# Patient Record
Sex: MALE | Race: BLACK OR AFRICAN AMERICAN
[De-identification: names, ages, dates, MRNs, and addresses within clinical notes are randomized per-mention and may not be internally consistent; named-entity substitution may affect disease eponyms.]

---

## 2019-07-13 ENCOUNTER — HOSPITAL ENCOUNTER (INPATIENT)
Dept: HOSPITAL 26 - MED | Age: 74
LOS: 12 days | Discharge: NURSING, REHAB OR LONG-TERM CARE | DRG: 870 | End: 2019-07-25
Attending: GENERAL PRACTICE | Admitting: GENERAL PRACTICE
Payer: COMMERCIAL

## 2019-07-13 VITALS
DIASTOLIC BLOOD PRESSURE: 103 MMHG | HEIGHT: 70 IN | SYSTOLIC BLOOD PRESSURE: 175 MMHG | WEIGHT: 120 LBS | BODY MASS INDEX: 17.18 KG/M2

## 2019-07-13 VITALS — DIASTOLIC BLOOD PRESSURE: 89 MMHG | SYSTOLIC BLOOD PRESSURE: 185 MMHG

## 2019-07-13 DIAGNOSIS — J69.0: ICD-10-CM

## 2019-07-13 DIAGNOSIS — E43: ICD-10-CM

## 2019-07-13 DIAGNOSIS — I25.2: ICD-10-CM

## 2019-07-13 DIAGNOSIS — Z91.14: ICD-10-CM

## 2019-07-13 DIAGNOSIS — I13.0: ICD-10-CM

## 2019-07-13 DIAGNOSIS — E87.6: ICD-10-CM

## 2019-07-13 DIAGNOSIS — A41.9: Primary | ICD-10-CM

## 2019-07-13 DIAGNOSIS — E87.2: ICD-10-CM

## 2019-07-13 DIAGNOSIS — J98.11: ICD-10-CM

## 2019-07-13 DIAGNOSIS — I50.9: ICD-10-CM

## 2019-07-13 DIAGNOSIS — E83.42: ICD-10-CM

## 2019-07-13 DIAGNOSIS — E03.9: ICD-10-CM

## 2019-07-13 DIAGNOSIS — Z59.0: ICD-10-CM

## 2019-07-13 DIAGNOSIS — J44.1: ICD-10-CM

## 2019-07-13 DIAGNOSIS — I25.10: ICD-10-CM

## 2019-07-13 DIAGNOSIS — J96.21: ICD-10-CM

## 2019-07-13 DIAGNOSIS — N17.0: ICD-10-CM

## 2019-07-13 DIAGNOSIS — N18.9: ICD-10-CM

## 2019-07-13 DIAGNOSIS — I21.A1: ICD-10-CM

## 2019-07-13 DIAGNOSIS — I16.1: ICD-10-CM

## 2019-07-13 DIAGNOSIS — I69.354: ICD-10-CM

## 2019-07-13 DIAGNOSIS — E11.22: ICD-10-CM

## 2019-07-13 DIAGNOSIS — J84.9: ICD-10-CM

## 2019-07-13 DIAGNOSIS — Z23: ICD-10-CM

## 2019-07-13 DIAGNOSIS — R65.21: ICD-10-CM

## 2019-07-13 DIAGNOSIS — F17.210: ICD-10-CM

## 2019-07-13 DIAGNOSIS — E78.5: ICD-10-CM

## 2019-07-13 LAB
ALBUMIN FLD-MCNC: 3 G/DL (ref 3.4–5)
ANION GAP SERPL CALCULATED.3IONS-SCNC: 18.1 MMOL/L (ref 8–16)
AST SERPL-CCNC: 15 U/L (ref 15–37)
BASOPHILS # BLD AUTO: 0 K/UL (ref 0–0.22)
BASOPHILS NFR BLD AUTO: 0.6 % (ref 0–2)
BILIRUB SERPL-MCNC: 0.5 MG/DL (ref 0–1)
BUN SERPL-MCNC: 17 MG/DL (ref 7–18)
CHLORIDE SERPL-SCNC: 104 MMOL/L (ref 98–107)
CO2 SERPL-SCNC: 21.7 MMOL/L (ref 21–32)
CREAT SERPL-MCNC: 1.7 MG/DL (ref 0.7–1.3)
EOSINOPHIL # BLD AUTO: 0.1 K/UL (ref 0–0.4)
EOSINOPHIL NFR BLD AUTO: 1.7 % (ref 0–4)
ERYTHROCYTE [DISTWIDTH] IN BLOOD BY AUTOMATED COUNT: 15.5 % (ref 11.6–13.7)
GFR SERPL CREATININE-BSD FRML MDRD: (no result) ML/MIN (ref 90–?)
GLUCOSE SERPL-MCNC: 243 MG/DL (ref 74–106)
HCT VFR BLD AUTO: 44.2 % (ref 36–52)
HGB BLD-MCNC: 14.1 G/DL (ref 12–18)
LYMPHOCYTES # BLD AUTO: 1.2 K/UL (ref 2–11.5)
LYMPHOCYTES NFR BLD AUTO: 22.8 % (ref 20.5–51.1)
MAGNESIUM SERPL-MCNC: 1.8 MG/DL (ref 1.8–2.4)
MCH RBC QN AUTO: 29 PG (ref 27–31)
MCHC RBC AUTO-ENTMCNC: 32 G/DL (ref 33–37)
MCV RBC AUTO: 91.1 FL (ref 80–94)
MONOCYTES # BLD AUTO: 0.3 K/UL (ref 0.8–1)
MONOCYTES NFR BLD AUTO: 6 % (ref 1.7–9.3)
NEUTROPHILS # BLD AUTO: 3.7 K/UL (ref 1.8–7.7)
NEUTROPHILS NFR BLD AUTO: 68.9 % (ref 42.2–75.2)
PLATELET # BLD AUTO: 207 K/UL (ref 140–450)
POTASSIUM SERPL-SCNC: 3.8 MMOL/L (ref 3.5–5.1)
RBC # BLD AUTO: 4.85 MIL/UL (ref 4.2–6.1)
SODIUM SERPL-SCNC: 140 MMOL/L (ref 136–145)
TSH SERPL DL<=0.05 MIU/L-ACNC: 5.21 UIU/ML (ref 0.34–3.74)
WBC # BLD AUTO: 5.4 K/UL (ref 4.8–10.8)

## 2019-07-13 PROCEDURE — C1751 CATH, INF, PER/CENT/MIDLINE: HCPCS

## 2019-07-13 PROCEDURE — 5A09357 ASSISTANCE WITH RESPIRATORY VENTILATION, LESS THAN 24 CONSECUTIVE HOURS, CONTINUOUS POSITIVE AIRWAY PRESSURE: ICD-10-PCS

## 2019-07-13 PROCEDURE — C9113 INJ PANTOPRAZOLE SODIUM, VIA: HCPCS

## 2019-07-13 SDOH — ECONOMIC STABILITY - HOUSING INSECURITY: HOMELESSNESS: Z59.0

## 2019-07-13 NOTE — NUR
Patient will be admitted to care of DR. GARSIA. Admited to ICU.  Will go to room 
5. Belongings list completed.  Report to ICU RN.

## 2019-07-13 NOTE — NUR
PT IS A 73 Y/O MALE WHO PRESENTS TO THE ED C/O SHORTNESS OF BREATH. PER EMS PT 
WAS WALKING OUTSIDE AND SUDDENLY BECAME SOB. PT NOTED TO BE ON CPAP ON INITIAL 
ASSESSMENT, WITH INITIAL O2 SAT 80S. PATIENT NOTED TO HAVE DIFFICULTY 
BREATHING. LUNG SOUNDS DIMINISHED IN BILATERAL BASES. PT IN NO SIGNS OF CP, 
N/V/D. PT AWAKE AND ALERT, ABLE TO ANSWER QUESTIONS APPROPRIATELY. PT 
REPOSITIONED FOR COMFORT, BED IN LOWEST POSITION. ER MD DR. FONG NOTIFIED. 
WILL CONTINUE TO MONITOR. 



HX HTN, DM

NKA

## 2019-07-13 NOTE — NUR
PT RECEIVED IN ER VIA AMBULANCE ON CPAP. PT SOB. PLACED ON V60 BIPAP 10/5 100% AND 
ADMINISTERED DUONEB TX INLINE. PT WHEEZING BILATERALLY. ABG DONE AND RESULTS CALLED TO ER 
PHYSICIAN. FIO2 CHANGED TO 50% PER MD'S APPROVAL. PT STABLE AT THIS TIME. NO DISTRESS NOTED. 
WILL MONITOR.

## 2019-07-14 VITALS — DIASTOLIC BLOOD PRESSURE: 78 MMHG | SYSTOLIC BLOOD PRESSURE: 140 MMHG

## 2019-07-14 VITALS — SYSTOLIC BLOOD PRESSURE: 112 MMHG | DIASTOLIC BLOOD PRESSURE: 75 MMHG

## 2019-07-14 VITALS — DIASTOLIC BLOOD PRESSURE: 128 MMHG | SYSTOLIC BLOOD PRESSURE: 199 MMHG

## 2019-07-14 VITALS — DIASTOLIC BLOOD PRESSURE: 114 MMHG | SYSTOLIC BLOOD PRESSURE: 155 MMHG

## 2019-07-14 VITALS — DIASTOLIC BLOOD PRESSURE: 64 MMHG | SYSTOLIC BLOOD PRESSURE: 102 MMHG

## 2019-07-14 VITALS — SYSTOLIC BLOOD PRESSURE: 130 MMHG | DIASTOLIC BLOOD PRESSURE: 63 MMHG

## 2019-07-14 VITALS — SYSTOLIC BLOOD PRESSURE: 123 MMHG | DIASTOLIC BLOOD PRESSURE: 78 MMHG

## 2019-07-14 VITALS — SYSTOLIC BLOOD PRESSURE: 145 MMHG | DIASTOLIC BLOOD PRESSURE: 89 MMHG

## 2019-07-14 VITALS — DIASTOLIC BLOOD PRESSURE: 128 MMHG | SYSTOLIC BLOOD PRESSURE: 173 MMHG

## 2019-07-14 VITALS — SYSTOLIC BLOOD PRESSURE: 137 MMHG | DIASTOLIC BLOOD PRESSURE: 75 MMHG

## 2019-07-14 VITALS — SYSTOLIC BLOOD PRESSURE: 140 MMHG | DIASTOLIC BLOOD PRESSURE: 73 MMHG

## 2019-07-14 VITALS — SYSTOLIC BLOOD PRESSURE: 145 MMHG | DIASTOLIC BLOOD PRESSURE: 67 MMHG

## 2019-07-14 VITALS — DIASTOLIC BLOOD PRESSURE: 71 MMHG | SYSTOLIC BLOOD PRESSURE: 132 MMHG

## 2019-07-14 VITALS — DIASTOLIC BLOOD PRESSURE: 66 MMHG | SYSTOLIC BLOOD PRESSURE: 100 MMHG

## 2019-07-14 VITALS — DIASTOLIC BLOOD PRESSURE: 66 MMHG | SYSTOLIC BLOOD PRESSURE: 119 MMHG

## 2019-07-14 VITALS — SYSTOLIC BLOOD PRESSURE: 164 MMHG | DIASTOLIC BLOOD PRESSURE: 92 MMHG

## 2019-07-14 VITALS — DIASTOLIC BLOOD PRESSURE: 86 MMHG | SYSTOLIC BLOOD PRESSURE: 145 MMHG

## 2019-07-14 VITALS — SYSTOLIC BLOOD PRESSURE: 134 MMHG | DIASTOLIC BLOOD PRESSURE: 72 MMHG

## 2019-07-14 VITALS — DIASTOLIC BLOOD PRESSURE: 75 MMHG | SYSTOLIC BLOOD PRESSURE: 171 MMHG

## 2019-07-14 VITALS — DIASTOLIC BLOOD PRESSURE: 69 MMHG | SYSTOLIC BLOOD PRESSURE: 137 MMHG

## 2019-07-14 VITALS — DIASTOLIC BLOOD PRESSURE: 67 MMHG | SYSTOLIC BLOOD PRESSURE: 128 MMHG

## 2019-07-14 VITALS — DIASTOLIC BLOOD PRESSURE: 65 MMHG | SYSTOLIC BLOOD PRESSURE: 119 MMHG

## 2019-07-14 VITALS — SYSTOLIC BLOOD PRESSURE: 104 MMHG | DIASTOLIC BLOOD PRESSURE: 68 MMHG

## 2019-07-14 VITALS — DIASTOLIC BLOOD PRESSURE: 75 MMHG | SYSTOLIC BLOOD PRESSURE: 137 MMHG

## 2019-07-14 VITALS — DIASTOLIC BLOOD PRESSURE: 109 MMHG | SYSTOLIC BLOOD PRESSURE: 156 MMHG

## 2019-07-14 VITALS — DIASTOLIC BLOOD PRESSURE: 95 MMHG | SYSTOLIC BLOOD PRESSURE: 150 MMHG

## 2019-07-14 VITALS — DIASTOLIC BLOOD PRESSURE: 84 MMHG | SYSTOLIC BLOOD PRESSURE: 128 MMHG

## 2019-07-14 VITALS — DIASTOLIC BLOOD PRESSURE: 62 MMHG | SYSTOLIC BLOOD PRESSURE: 159 MMHG

## 2019-07-14 VITALS — SYSTOLIC BLOOD PRESSURE: 123 MMHG | DIASTOLIC BLOOD PRESSURE: 69 MMHG

## 2019-07-14 VITALS — SYSTOLIC BLOOD PRESSURE: 130 MMHG | DIASTOLIC BLOOD PRESSURE: 67 MMHG

## 2019-07-14 VITALS — DIASTOLIC BLOOD PRESSURE: 74 MMHG | SYSTOLIC BLOOD PRESSURE: 149 MMHG

## 2019-07-14 VITALS — SYSTOLIC BLOOD PRESSURE: 138 MMHG | DIASTOLIC BLOOD PRESSURE: 89 MMHG

## 2019-07-14 VITALS — DIASTOLIC BLOOD PRESSURE: 66 MMHG | SYSTOLIC BLOOD PRESSURE: 159 MMHG

## 2019-07-14 VITALS — SYSTOLIC BLOOD PRESSURE: 125 MMHG | DIASTOLIC BLOOD PRESSURE: 63 MMHG

## 2019-07-14 VITALS — DIASTOLIC BLOOD PRESSURE: 63 MMHG | SYSTOLIC BLOOD PRESSURE: 133 MMHG

## 2019-07-14 VITALS — DIASTOLIC BLOOD PRESSURE: 70 MMHG | SYSTOLIC BLOOD PRESSURE: 112 MMHG

## 2019-07-14 VITALS — DIASTOLIC BLOOD PRESSURE: 94 MMHG | SYSTOLIC BLOOD PRESSURE: 173 MMHG

## 2019-07-14 VITALS — SYSTOLIC BLOOD PRESSURE: 122 MMHG | DIASTOLIC BLOOD PRESSURE: 72 MMHG

## 2019-07-14 VITALS — SYSTOLIC BLOOD PRESSURE: 112 MMHG | DIASTOLIC BLOOD PRESSURE: 76 MMHG

## 2019-07-14 VITALS — DIASTOLIC BLOOD PRESSURE: 118 MMHG | SYSTOLIC BLOOD PRESSURE: 189 MMHG

## 2019-07-14 VITALS — DIASTOLIC BLOOD PRESSURE: 91 MMHG | SYSTOLIC BLOOD PRESSURE: 157 MMHG

## 2019-07-14 VITALS — DIASTOLIC BLOOD PRESSURE: 63 MMHG | SYSTOLIC BLOOD PRESSURE: 125 MMHG

## 2019-07-14 VITALS — DIASTOLIC BLOOD PRESSURE: 75 MMHG | SYSTOLIC BLOOD PRESSURE: 116 MMHG

## 2019-07-14 VITALS — SYSTOLIC BLOOD PRESSURE: 130 MMHG | DIASTOLIC BLOOD PRESSURE: 76 MMHG

## 2019-07-14 VITALS — DIASTOLIC BLOOD PRESSURE: 48 MMHG | SYSTOLIC BLOOD PRESSURE: 98 MMHG

## 2019-07-14 VITALS — SYSTOLIC BLOOD PRESSURE: 127 MMHG | DIASTOLIC BLOOD PRESSURE: 86 MMHG

## 2019-07-14 VITALS — SYSTOLIC BLOOD PRESSURE: 145 MMHG | DIASTOLIC BLOOD PRESSURE: 62 MMHG

## 2019-07-14 VITALS — SYSTOLIC BLOOD PRESSURE: 148 MMHG | DIASTOLIC BLOOD PRESSURE: 84 MMHG

## 2019-07-14 VITALS — DIASTOLIC BLOOD PRESSURE: 92 MMHG | SYSTOLIC BLOOD PRESSURE: 118 MMHG

## 2019-07-14 VITALS — SYSTOLIC BLOOD PRESSURE: 120 MMHG | DIASTOLIC BLOOD PRESSURE: 89 MMHG

## 2019-07-14 VITALS — SYSTOLIC BLOOD PRESSURE: 123 MMHG | DIASTOLIC BLOOD PRESSURE: 62 MMHG

## 2019-07-14 VITALS — DIASTOLIC BLOOD PRESSURE: 60 MMHG | SYSTOLIC BLOOD PRESSURE: 128 MMHG

## 2019-07-14 VITALS — DIASTOLIC BLOOD PRESSURE: 81 MMHG | SYSTOLIC BLOOD PRESSURE: 138 MMHG

## 2019-07-14 VITALS — DIASTOLIC BLOOD PRESSURE: 63 MMHG | SYSTOLIC BLOOD PRESSURE: 148 MMHG

## 2019-07-14 VITALS — SYSTOLIC BLOOD PRESSURE: 131 MMHG | DIASTOLIC BLOOD PRESSURE: 68 MMHG

## 2019-07-14 VITALS — DIASTOLIC BLOOD PRESSURE: 72 MMHG | SYSTOLIC BLOOD PRESSURE: 134 MMHG

## 2019-07-14 VITALS — SYSTOLIC BLOOD PRESSURE: 132 MMHG | DIASTOLIC BLOOD PRESSURE: 71 MMHG

## 2019-07-14 VITALS — SYSTOLIC BLOOD PRESSURE: 135 MMHG | DIASTOLIC BLOOD PRESSURE: 70 MMHG

## 2019-07-14 VITALS — DIASTOLIC BLOOD PRESSURE: 75 MMHG | SYSTOLIC BLOOD PRESSURE: 112 MMHG

## 2019-07-14 VITALS — SYSTOLIC BLOOD PRESSURE: 161 MMHG | DIASTOLIC BLOOD PRESSURE: 79 MMHG

## 2019-07-14 VITALS — DIASTOLIC BLOOD PRESSURE: 85 MMHG | SYSTOLIC BLOOD PRESSURE: 100 MMHG

## 2019-07-14 VITALS — DIASTOLIC BLOOD PRESSURE: 133 MMHG | SYSTOLIC BLOOD PRESSURE: 225 MMHG

## 2019-07-14 VITALS — SYSTOLIC BLOOD PRESSURE: 137 MMHG | DIASTOLIC BLOOD PRESSURE: 85 MMHG

## 2019-07-14 VITALS — SYSTOLIC BLOOD PRESSURE: 159 MMHG | DIASTOLIC BLOOD PRESSURE: 88 MMHG

## 2019-07-14 VITALS — SYSTOLIC BLOOD PRESSURE: 148 MMHG | DIASTOLIC BLOOD PRESSURE: 99 MMHG

## 2019-07-14 VITALS — SYSTOLIC BLOOD PRESSURE: 163 MMHG | DIASTOLIC BLOOD PRESSURE: 92 MMHG

## 2019-07-14 VITALS — SYSTOLIC BLOOD PRESSURE: 127 MMHG | DIASTOLIC BLOOD PRESSURE: 67 MMHG

## 2019-07-14 VITALS — SYSTOLIC BLOOD PRESSURE: 134 MMHG | DIASTOLIC BLOOD PRESSURE: 77 MMHG

## 2019-07-14 VITALS — SYSTOLIC BLOOD PRESSURE: 201 MMHG | DIASTOLIC BLOOD PRESSURE: 144 MMHG

## 2019-07-14 VITALS — DIASTOLIC BLOOD PRESSURE: 70 MMHG | SYSTOLIC BLOOD PRESSURE: 106 MMHG

## 2019-07-14 VITALS — DIASTOLIC BLOOD PRESSURE: 64 MMHG | SYSTOLIC BLOOD PRESSURE: 159 MMHG

## 2019-07-14 VITALS — SYSTOLIC BLOOD PRESSURE: 155 MMHG | DIASTOLIC BLOOD PRESSURE: 107 MMHG

## 2019-07-14 VITALS — SYSTOLIC BLOOD PRESSURE: 133 MMHG | DIASTOLIC BLOOD PRESSURE: 67 MMHG

## 2019-07-14 VITALS — SYSTOLIC BLOOD PRESSURE: 128 MMHG | DIASTOLIC BLOOD PRESSURE: 82 MMHG

## 2019-07-14 LAB
AMYLASE SERPL-CCNC: 87 U/L (ref 25–115)
ANION GAP SERPL CALCULATED.3IONS-SCNC: 14.4 MMOL/L (ref 8–16)
APPEARANCE UR: (no result)
BARBITURATES UR QL SCN: (no result) NG/ML
BASOPHILS # BLD AUTO: 0 K/UL (ref 0–0.22)
BASOPHILS NFR BLD AUTO: 0.7 % (ref 0–2)
BENZODIAZ UR QL SCN: (no result) NG/ML
BILIRUB UR QL STRIP: NEGATIVE
BUN SERPL-MCNC: 16 MG/DL (ref 7–18)
BZE UR QL SCN: (no result) NG/ML
CANNABINOIDS UR QL SCN: (no result) NG/ML
CHLORIDE SERPL-SCNC: 108 MMOL/L (ref 98–107)
CHOLEST/HDLC SERPL: 3.3 {RATIO} (ref 1–4.5)
CO2 SERPL-SCNC: 22.5 MMOL/L (ref 21–32)
COLOR UR: YELLOW
CREAT SERPL-MCNC: 1.5 MG/DL (ref 0.7–1.3)
EOSINOPHIL # BLD AUTO: 0 K/UL (ref 0–0.4)
EOSINOPHIL NFR BLD AUTO: 0.3 % (ref 0–4)
ERYTHROCYTE [DISTWIDTH] IN BLOOD BY AUTOMATED COUNT: 14.8 % (ref 11.6–13.7)
GFR SERPL CREATININE-BSD FRML MDRD: (no result) ML/MIN (ref 90–?)
GLUCOSE SERPL-MCNC: 187 MG/DL (ref 74–106)
GLUCOSE UR STRIP-MCNC: NEGATIVE MG/DL
HCT VFR BLD AUTO: 37.5 % (ref 36–52)
HDLC SERPL-MCNC: 58 MG/DL (ref 40–60)
HGB BLD-MCNC: 12 G/DL (ref 12–18)
HGB UR QL STRIP: (no result)
LDLC SERPL CALC-MCNC: 119 MG/DL (ref 60–100)
LEUKOCYTE ESTERASE UR QL STRIP: NEGATIVE
LIPASE SERPL-CCNC: 86 U/L (ref 73–393)
LYMPHOCYTES # BLD AUTO: 0.7 K/UL (ref 2–11.5)
LYMPHOCYTES NFR BLD AUTO: 12 % (ref 20.5–51.1)
MAGNESIUM SERPL-MCNC: 1.7 MG/DL (ref 1.8–2.4)
MCH RBC QN AUTO: 29 PG (ref 27–31)
MCHC RBC AUTO-ENTMCNC: 32 G/DL (ref 33–37)
MCV RBC AUTO: 90.2 FL (ref 80–94)
MONOCYTES # BLD AUTO: 0.4 K/UL (ref 0.8–1)
MONOCYTES NFR BLD AUTO: 6 % (ref 1.7–9.3)
NEUTROPHILS # BLD AUTO: 4.9 K/UL (ref 1.8–7.7)
NEUTROPHILS NFR BLD AUTO: 81 % (ref 42.2–75.2)
NITRITE UR QL STRIP: NEGATIVE
OPIATES UR QL SCN: (no result) NG/ML
PCP UR QL SCN: (no result) NG/ML
PH UR STRIP: 5.5 [PH] (ref 5–9)
PHOSPHATE SERPL-MCNC: 3.6 MG/DL (ref 2.5–4.9)
PHOSPHATE SERPL-MCNC: 4.6 MG/DL (ref 2.5–4.9)
PLATELET # BLD AUTO: 182 K/UL (ref 140–450)
POTASSIUM SERPL-SCNC: 3.9 MMOL/L (ref 3.5–5.1)
PROTHROMBIN TIME: 10.6 SECS (ref 10.8–13.4)
RBC # BLD AUTO: 4.15 MIL/UL (ref 4.2–6.1)
RBC #/AREA URNS HPF: (no result) /HPF (ref 0–5)
SODIUM SERPL-SCNC: 141 MMOL/L (ref 136–145)
T4 FREE SERPL-MCNC: 1 NG/DL (ref 0.76–1.46)
TRIGL SERPL-MCNC: 75 MG/DL (ref 30–150)
TSH SERPL DL<=0.05 MIU/L-ACNC: 5.09 UIU/ML (ref 0.34–3.74)
WBC # BLD AUTO: 6.1 K/UL (ref 4.8–10.8)
WBC,URINE: (no result) /HPF (ref 0–5)

## 2019-07-14 PROCEDURE — 0BH17EZ INSERTION OF ENDOTRACHEAL AIRWAY INTO TRACHEA, VIA NATURAL OR ARTIFICIAL OPENING: ICD-10-PCS

## 2019-07-14 PROCEDURE — 5A1955Z RESPIRATORY VENTILATION, GREATER THAN 96 CONSECUTIVE HOURS: ICD-10-PCS

## 2019-07-14 RX ADMIN — IPRATROPIUM BROMIDE AND ALBUTEROL SULFATE SCH ML: .5; 3 SOLUTION RESPIRATORY (INHALATION) at 11:58

## 2019-07-14 RX ADMIN — ATORVASTATIN CALCIUM SCH MG: 20 TABLET, FILM COATED ORAL at 10:38

## 2019-07-14 RX ADMIN — INSULIN HUMAN PRN MLS/HR: 100 INJECTION, SOLUTION PARENTERAL at 09:27

## 2019-07-14 RX ADMIN — IPRATROPIUM BROMIDE AND ALBUTEROL SULFATE SCH ML: .5; 3 SOLUTION RESPIRATORY (INHALATION) at 23:22

## 2019-07-14 RX ADMIN — DEXTROSE AND SODIUM CHLORIDE SCH MLS/HR: 5; .9 INJECTION, SOLUTION INTRAVENOUS at 01:04

## 2019-07-14 RX ADMIN — INSULIN LISPRO PRN UNITS: 100 INJECTION, SOLUTION INTRAVENOUS; SUBCUTANEOUS at 16:29

## 2019-07-14 RX ADMIN — IPRATROPIUM BROMIDE AND ALBUTEROL SULFATE PRN ML: .5; 3 SOLUTION RESPIRATORY (INHALATION) at 00:39

## 2019-07-14 RX ADMIN — PANTOPRAZOLE SODIUM SCH MG: 40 INJECTION, POWDER, FOR SOLUTION INTRAVENOUS at 10:37

## 2019-07-14 RX ADMIN — Medication SCH DEV: at 11:56

## 2019-07-14 RX ADMIN — IPRATROPIUM BROMIDE AND ALBUTEROL SULFATE PRN ML: .5; 3 SOLUTION RESPIRATORY (INHALATION) at 09:47

## 2019-07-14 RX ADMIN — INSULIN LISPRO PRN UNITS: 100 INJECTION, SOLUTION INTRAVENOUS; SUBCUTANEOUS at 11:51

## 2019-07-14 RX ADMIN — DEXTROSE AND SODIUM CHLORIDE SCH MLS/HR: 5; .9 INJECTION, SOLUTION INTRAVENOUS at 09:44

## 2019-07-14 RX ADMIN — BUDESONIDE SCH MG: 0.25 SUSPENSION RESPIRATORY (INHALATION) at 09:46

## 2019-07-14 RX ADMIN — HEPARIN SODIUM SCH MLS/HR: 5000 INJECTION, SOLUTION INTRAVENOUS at 10:26

## 2019-07-14 RX ADMIN — IPRATROPIUM BROMIDE AND ALBUTEROL SULFATE SCH ML: .5; 3 SOLUTION RESPIRATORY (INHALATION) at 19:55

## 2019-07-14 RX ADMIN — IPRATROPIUM BROMIDE AND ALBUTEROL SULFATE SCH ML: .5; 3 SOLUTION RESPIRATORY (INHALATION) at 15:42

## 2019-07-14 RX ADMIN — SODIUM CHLORIDE PRN MLS/HR: 9 INJECTION, SOLUTION INTRAVENOUS at 09:33

## 2019-07-14 RX ADMIN — Medication SCH DEV: at 16:30

## 2019-07-14 RX ADMIN — PROPOFOL PRN MLS/HR: 10 INJECTION, EMULSION INTRAVENOUS at 09:43

## 2019-07-14 RX ADMIN — DOCUSATE SODIUM SCH MG: 100 CAPSULE, LIQUID FILLED ORAL at 20:31

## 2019-07-14 RX ADMIN — Medication SCH MG: at 10:37

## 2019-07-14 RX ADMIN — DEXTROSE SCH MLS/HR: 50 INJECTION, SOLUTION INTRAVENOUS at 23:35

## 2019-07-14 RX ADMIN — DOCUSATE SODIUM SCH MG: 100 CAPSULE, LIQUID FILLED ORAL at 10:38

## 2019-07-14 RX ADMIN — DEXTROSE AND SODIUM CHLORIDE SCH MLS/HR: 5; .9 INJECTION, SOLUTION INTRAVENOUS at 20:25

## 2019-07-14 RX ADMIN — DEXTROSE SCH MLS/HR: 50 INJECTION, SOLUTION INTRAVENOUS at 22:13

## 2019-07-14 RX ADMIN — PROPOFOL PRN MLS/HR: 10 INJECTION, EMULSION INTRAVENOUS at 22:38

## 2019-07-14 RX ADMIN — Medication SCH DEV: at 20:47

## 2019-07-14 RX ADMIN — BUDESONIDE SCH MG: 0.25 SUSPENSION RESPIRATORY (INHALATION) at 19:55

## 2019-07-14 RX ADMIN — PROPOFOL PRN MLS/HR: 10 INJECTION, EMULSION INTRAVENOUS at 12:14

## 2019-07-14 RX ADMIN — PROPOFOL PRN MLS/HR: 10 INJECTION, EMULSION INTRAVENOUS at 16:25

## 2019-07-14 RX ADMIN — NOREPINEPHRINE BITARTRATE SCH MLS/HR: 1 INJECTION INTRAVENOUS at 09:55

## 2019-07-14 RX ADMIN — DEXTROSE SCH MLS/HR: 50 INJECTION, SOLUTION INTRAVENOUS at 15:21

## 2019-07-14 RX ADMIN — DORZOLAMIDE HYDROCHLORIDE SCH ML: 20 SOLUTION OPHTHALMIC at 09:00

## 2019-07-14 RX ADMIN — Medication SCH DEV: at 07:00

## 2019-07-14 RX ADMIN — NOREPINEPHRINE BITARTRATE SCH MLS/HR: 1 INJECTION INTRAVENOUS at 20:27

## 2019-07-14 RX ADMIN — INSULIN LISPRO PRN UNITS: 100 INJECTION, SOLUTION INTRAVENOUS; SUBCUTANEOUS at 20:51

## 2019-07-14 RX ADMIN — DORZOLAMIDE HYDROCHLORIDE SCH ML: 20 SOLUTION OPHTHALMIC at 20:30

## 2019-07-14 NOTE — NUR
RECEIVED REPORT FROM MORNING RN, ANNA MARIE, FOR CONTINUITY OF CARE. VS STABLE AT THIS TIME. 
AFEBRILE. FLACC 0. PT UNABLE TO MAKE NEEDS KNOW OR VERBALIZE UNDERSTANDING. PT CURRENTLY 
SEDATED WITH PROPOFOL AT 50MCG/KG/MIN WITH DRY WT 68KG. RASS -4. FENTANYL DRIP RUNNING AT 
34MCG/HR. VERSED AT 2MG/HR. PT DOES NOT APPEAR TO BE IN ANY DISCOMFORT AT THIS TIME. LUNG 
SOUNDS DIMINISHED. ETT TO VENT WITH SETTINGS A/C VC FIO2 80%, , RR16, AND PEEP 7. 
CHEST RISE SYMMETRIC. NO SOB NOTED. S1+S2 HEARD. PULSES PALPABLE IN ALL EXTREMITIES. AFIB ON 
MONITOR. BP WNL. PT ON AMIODARONE DRIP AT 0.5MG/MIN.  PT ON HEPARIN  UNITS/HR. ABDOMEN 
ROUND, SOFT AND NONDISTENDED. BS ACTIVE IN ALL QUADRANTS. NGT THROUGH RIGHT NARES. SECURED 
IN PLACE. PLACEMENT CHECKED. NO RESIDUAL ASPIRATED. PT HAS GLUCERNA AT 20ML/HR. PT HAS RIGHT 
UPPER ARM PICC LINE THAT IS PATENT, INTACT AND ASYMPTOMATIC. DRESSING C/D/I AS WELL. PT HAS 
D5 NS AT 75ML/HR. SKIN IS WARM AND DRY. PT'S BILATERAL FOOT IS DRY AND FLAKY. NELSON CATHETER 
IN PLACE WITH CLOUDY, DARK NARCISO URINE WITH SEDIMENTS. KEPT HOB AT 30 DEGREES. BED AT LOW 
POSSIBLE POSITION. ALL SAFETY PRECAUTIONS ARE IN PLACE.

## 2019-07-14 NOTE — NUR
DR. ALFONSO AND DR. MANSFIELD AT THE BEDSIDE. RT AT THE BEDSIDE. INTUBATION DONE BY DR. JONES. ASSISTED AS NEEDED.

## 2019-07-14 NOTE — NUR
ABG ON SETTINGS OF: RATE 16, , INSP TIME 0.8, PEEP 7, 80% O2 - PH 7.35, CO 34, PO 69, 
HCO3 19, SO2 93.5. DR JONES HAS ORDERED TO CHANGE PEEP TO 8.

## 2019-07-14 NOTE — NUR
CALLED DR. SALINAS TO NOTIFY HIM THAT PT'S FAMILY IS AT BEDSIDE AND WANTED TO SPEAK WITH HIM 
REGARDING THE PT'S CONDITION. PER DR. SALINAS, HE WILL COME TO THE UNIT

## 2019-07-14 NOTE — NUR
NO CHANGE IN CONDITION NOTED. CONTINUE ON SAAME VENT SETTING. ON SEDATION. RASS -4. HOB 
ELEVATED. IV SITES INTACT.

## 2019-07-14 NOTE — NUR
PATIENT HAS BEEN INTUBATED. SETTINGS: AC/PRVC RATE 14, , TI 0.80, PEEP 5, 100% O2. 
SUBSTERNAL RETRACTIONS CAN BE SEEN. CLOSELY MONITORING.

## 2019-07-14 NOTE — NUR
RECEIVED BEDSIDE REPORT FROM ER RN, PATIENT AWAKE, ABLE TO FOLLOW COMMANDS. O2SAT 86% RR 30 
LABORED AND DEEP, RT AT BEDSIDE, PLACED PATIENT ON BIPAP. A-FIB ON MONITOR, /92 MAP 
110. NOTED 1+ PITTING EDEMA ON BLE, SKIN PEELING ON BOTH FEET, NO OPEN WOUNDS. LEFT FA 18 G 
INFUSING VANCO  ML. RIGHT FA 20 GM DRESSING INTACT. BED BATH PROVIDED. ADMISSION 
QUESTIONS ASKED. MRSA SCREEN COLLECTED AND SENT TO LAB.

## 2019-07-14 NOTE — NUR
RESTING IN BED. CONTINUE ON VENT SETTING AC/PRVC FIO2 80%  , RR 16 PEEP 7. CONTINUE ON 
SEDATION RASS -4. HOB ELEVATED. BED IN LOW POSITION LOCKED.

## 2019-07-14 NOTE — NUR
RECEIVED REPORT FROM NIGHT SHIFT RN. PT IN BED, NOTED WITH LABORED BREATHING ON BIPAP, FIO2 
40%. A FIB ON MONITOR.  SPO2 91%. HOB ELEVATED. A/O X3. SKIN DRY AND  WARM TO TOUCH. LUNGS 
CLEAR. RFA 20G. D5%NS INFUSING  ML/HR. LFA 18G. SALINE LOCK. FLUSHED. LINES INTACT. 
ABDOMEN SOFT ROUND AND NON-TENDER. ACTIVE BOWEL SOUND. DRY, PEELING SKIN NOTED ON B/L FEET. 
EDEMATOUS BOTH LOWER EXTREMITIES NOTED. WILL CONTINUE TO MONITOR.

## 2019-07-14 NOTE — NUR
CHEST X-RAY RESULT REVEALED THAT THERE IS NO FOREIGN OBJECT IN THE CHEST. DR. SETHI IN 
ICU MADE AWARE. OLD GUIDE WIRE USED DURING CENTRAL LINE INSERTION FOR PATIENT WAS COMPARED 
TO NEW SET OF CENTRAL LINE GUIDE WIRE.  BOTH GUIDE WIRES WERE EQUAL IN LENGTH. NO BROKEN IN 
PATIENT'S GUIDE WIRE NOTED.

## 2019-07-14 NOTE — NUR
SUCTIONED LARGE AMOUNT OF BLOOD TINGED SPUTUM. VAP ORAL CARE PROVIDED. KEPT PT CLEAN AND 
DRY. REPOSITIONED. BED IN LOW POSITIONED LOCKED.

## 2019-07-14 NOTE — NUR
CALLED [facility], SPOKE TO HOUSE SUPERVISOR FOR URGENT TRANSFER TO HIGHER LEVEL OF CARE 
FOR GUIDE WIRE RETRIEVAL NEEDS FOR INTERVENTIONAL RADIOLOGY.

## 2019-07-14 NOTE — NUR
CALLED [facility], SPOKE TO ROSA M FOR URGENT TRANSFER TO HIGHER LEVEL OF CARE FOR GUIDE WIRE 
RETRIEVAL NEEDS FOR INTERVENTIONAL RADIOLOGY.

## 2019-07-14 NOTE — NUR
RECEIVED PT INTUBATED, VENT CK DONE, PEEP 8, AND I DECREASED FIO2 TO 70%, MED NEB IN LINE 
DUONEB AND FALLOW WITH PULMICORT

## 2019-07-14 NOTE — NUR
7/14/19 RD INITIAL ASSESSMENT COMPLETED 

PLEASE REFER TO NUTRITION ASSESSMENT UNDER CARE ACTIVITY FOR ESTIMATED NUTRITIONAL NEEDS. 



RD RECOMMENDATIONS:

1. RECOMMEND INCREASE TF RATE OF GLUCERNA 1.2 TO 65ML/HR, FWF:100ML Q 6HR. THIS PROVIDES 
1872KCAL, 94G PROTEIN AND 1660ML TOTAL FLUID DAILY. THIS MEETS 92% CALORIC NEEDS % 
PROTEIN NEEDS. 

2.PROPOFOL AT 12.24ML/HR PROVIDES AN ADDITIONAL 324KCAL.

3. RD WILL F/U 2-3 DAYS; HIGH RISK. 

KATTY CHISHOLM, RD

## 2019-07-14 NOTE — NUR
CALLED LAB TO NOTIFY THEM REGARDING NEW ORDERS THAT DR. CLARK PUT IN WHEN HE MADE ROUNDS. 
SPOKE WITH MUMTAZ AND ACCORDING TO HER IT WAS COLLECTED.

## 2019-07-14 NOTE — NUR
PATIENT HAS BEEN SCREENED AND CATEGORIZED AS HIGH NUTRITION RISK. PATIENT WILL BE SEEN 
WITHIN 1-2 DAYS OF ADMISSION.

7/15/19



KATTY CHISHOLM RD

## 2019-07-14 NOTE — NUR
PATIENT'S SETTINGS CHANGED TO: RATE 16, , INSP TIME 0.8, PEEP 7, 80% O2. PT IS STABLE. 
CLOSELY MONITORED.

## 2019-07-14 NOTE — NUR
DR. CLARK AT BEDSIDE TO SEE PT. UPDATED HIM REGARDING THE PT'S CONDITION. WILL FOLLOW-UP 
WITH ANY NEW ORDERS.

## 2019-07-15 VITALS — SYSTOLIC BLOOD PRESSURE: 147 MMHG | DIASTOLIC BLOOD PRESSURE: 112 MMHG

## 2019-07-15 VITALS — SYSTOLIC BLOOD PRESSURE: 159 MMHG | DIASTOLIC BLOOD PRESSURE: 74 MMHG

## 2019-07-15 VITALS — DIASTOLIC BLOOD PRESSURE: 69 MMHG | SYSTOLIC BLOOD PRESSURE: 132 MMHG

## 2019-07-15 VITALS — DIASTOLIC BLOOD PRESSURE: 78 MMHG | SYSTOLIC BLOOD PRESSURE: 157 MMHG

## 2019-07-15 VITALS — DIASTOLIC BLOOD PRESSURE: 94 MMHG | SYSTOLIC BLOOD PRESSURE: 151 MMHG

## 2019-07-15 VITALS — SYSTOLIC BLOOD PRESSURE: 146 MMHG | DIASTOLIC BLOOD PRESSURE: 84 MMHG

## 2019-07-15 VITALS — SYSTOLIC BLOOD PRESSURE: 155 MMHG | DIASTOLIC BLOOD PRESSURE: 79 MMHG

## 2019-07-15 VITALS — DIASTOLIC BLOOD PRESSURE: 95 MMHG | SYSTOLIC BLOOD PRESSURE: 133 MMHG

## 2019-07-15 VITALS — SYSTOLIC BLOOD PRESSURE: 136 MMHG | DIASTOLIC BLOOD PRESSURE: 97 MMHG

## 2019-07-15 VITALS — DIASTOLIC BLOOD PRESSURE: 63 MMHG | SYSTOLIC BLOOD PRESSURE: 113 MMHG

## 2019-07-15 VITALS — DIASTOLIC BLOOD PRESSURE: 67 MMHG | SYSTOLIC BLOOD PRESSURE: 117 MMHG

## 2019-07-15 VITALS — DIASTOLIC BLOOD PRESSURE: 71 MMHG | SYSTOLIC BLOOD PRESSURE: 124 MMHG

## 2019-07-15 VITALS — DIASTOLIC BLOOD PRESSURE: 73 MMHG | SYSTOLIC BLOOD PRESSURE: 107 MMHG

## 2019-07-15 VITALS — DIASTOLIC BLOOD PRESSURE: 86 MMHG | SYSTOLIC BLOOD PRESSURE: 160 MMHG

## 2019-07-15 VITALS — SYSTOLIC BLOOD PRESSURE: 152 MMHG | DIASTOLIC BLOOD PRESSURE: 85 MMHG

## 2019-07-15 VITALS — DIASTOLIC BLOOD PRESSURE: 68 MMHG | SYSTOLIC BLOOD PRESSURE: 132 MMHG

## 2019-07-15 VITALS — SYSTOLIC BLOOD PRESSURE: 116 MMHG | DIASTOLIC BLOOD PRESSURE: 76 MMHG

## 2019-07-15 VITALS — DIASTOLIC BLOOD PRESSURE: 74 MMHG | SYSTOLIC BLOOD PRESSURE: 142 MMHG

## 2019-07-15 VITALS — DIASTOLIC BLOOD PRESSURE: 71 MMHG | SYSTOLIC BLOOD PRESSURE: 138 MMHG

## 2019-07-15 VITALS — DIASTOLIC BLOOD PRESSURE: 65 MMHG | SYSTOLIC BLOOD PRESSURE: 118 MMHG

## 2019-07-15 VITALS — DIASTOLIC BLOOD PRESSURE: 54 MMHG | SYSTOLIC BLOOD PRESSURE: 108 MMHG

## 2019-07-15 VITALS — SYSTOLIC BLOOD PRESSURE: 131 MMHG | DIASTOLIC BLOOD PRESSURE: 82 MMHG

## 2019-07-15 VITALS — DIASTOLIC BLOOD PRESSURE: 69 MMHG | SYSTOLIC BLOOD PRESSURE: 126 MMHG

## 2019-07-15 VITALS — SYSTOLIC BLOOD PRESSURE: 158 MMHG | DIASTOLIC BLOOD PRESSURE: 55 MMHG

## 2019-07-15 VITALS — DIASTOLIC BLOOD PRESSURE: 93 MMHG | SYSTOLIC BLOOD PRESSURE: 149 MMHG

## 2019-07-15 VITALS — SYSTOLIC BLOOD PRESSURE: 140 MMHG | DIASTOLIC BLOOD PRESSURE: 75 MMHG

## 2019-07-15 VITALS — SYSTOLIC BLOOD PRESSURE: 172 MMHG | DIASTOLIC BLOOD PRESSURE: 110 MMHG

## 2019-07-15 VITALS — DIASTOLIC BLOOD PRESSURE: 85 MMHG | SYSTOLIC BLOOD PRESSURE: 157 MMHG

## 2019-07-15 VITALS — DIASTOLIC BLOOD PRESSURE: 92 MMHG | SYSTOLIC BLOOD PRESSURE: 119 MMHG

## 2019-07-15 VITALS — DIASTOLIC BLOOD PRESSURE: 63 MMHG | SYSTOLIC BLOOD PRESSURE: 94 MMHG

## 2019-07-15 VITALS — SYSTOLIC BLOOD PRESSURE: 172 MMHG | DIASTOLIC BLOOD PRESSURE: 99 MMHG

## 2019-07-15 VITALS — DIASTOLIC BLOOD PRESSURE: 58 MMHG | SYSTOLIC BLOOD PRESSURE: 139 MMHG

## 2019-07-15 VITALS — DIASTOLIC BLOOD PRESSURE: 73 MMHG | SYSTOLIC BLOOD PRESSURE: 117 MMHG

## 2019-07-15 VITALS — DIASTOLIC BLOOD PRESSURE: 124 MMHG | SYSTOLIC BLOOD PRESSURE: 103 MMHG

## 2019-07-15 VITALS — SYSTOLIC BLOOD PRESSURE: 138 MMHG | DIASTOLIC BLOOD PRESSURE: 61 MMHG

## 2019-07-15 VITALS — DIASTOLIC BLOOD PRESSURE: 77 MMHG | SYSTOLIC BLOOD PRESSURE: 146 MMHG

## 2019-07-15 VITALS — SYSTOLIC BLOOD PRESSURE: 137 MMHG | DIASTOLIC BLOOD PRESSURE: 78 MMHG

## 2019-07-15 VITALS — SYSTOLIC BLOOD PRESSURE: 124 MMHG | DIASTOLIC BLOOD PRESSURE: 78 MMHG

## 2019-07-15 VITALS — SYSTOLIC BLOOD PRESSURE: 104 MMHG | DIASTOLIC BLOOD PRESSURE: 55 MMHG

## 2019-07-15 VITALS — DIASTOLIC BLOOD PRESSURE: 84 MMHG | SYSTOLIC BLOOD PRESSURE: 125 MMHG

## 2019-07-15 VITALS — SYSTOLIC BLOOD PRESSURE: 143 MMHG | DIASTOLIC BLOOD PRESSURE: 97 MMHG

## 2019-07-15 VITALS — DIASTOLIC BLOOD PRESSURE: 71 MMHG | SYSTOLIC BLOOD PRESSURE: 121 MMHG

## 2019-07-15 VITALS — SYSTOLIC BLOOD PRESSURE: 127 MMHG | DIASTOLIC BLOOD PRESSURE: 66 MMHG

## 2019-07-15 VITALS — SYSTOLIC BLOOD PRESSURE: 92 MMHG | DIASTOLIC BLOOD PRESSURE: 51 MMHG

## 2019-07-15 VITALS — SYSTOLIC BLOOD PRESSURE: 152 MMHG | DIASTOLIC BLOOD PRESSURE: 82 MMHG

## 2019-07-15 VITALS — DIASTOLIC BLOOD PRESSURE: 72 MMHG | SYSTOLIC BLOOD PRESSURE: 128 MMHG

## 2019-07-15 VITALS — DIASTOLIC BLOOD PRESSURE: 73 MMHG | SYSTOLIC BLOOD PRESSURE: 145 MMHG

## 2019-07-15 VITALS — DIASTOLIC BLOOD PRESSURE: 70 MMHG | SYSTOLIC BLOOD PRESSURE: 153 MMHG

## 2019-07-15 VITALS — SYSTOLIC BLOOD PRESSURE: 98 MMHG | DIASTOLIC BLOOD PRESSURE: 57 MMHG

## 2019-07-15 VITALS — DIASTOLIC BLOOD PRESSURE: 79 MMHG | SYSTOLIC BLOOD PRESSURE: 136 MMHG

## 2019-07-15 VITALS — DIASTOLIC BLOOD PRESSURE: 71 MMHG | SYSTOLIC BLOOD PRESSURE: 145 MMHG

## 2019-07-15 VITALS — SYSTOLIC BLOOD PRESSURE: 128 MMHG | DIASTOLIC BLOOD PRESSURE: 72 MMHG

## 2019-07-15 VITALS — DIASTOLIC BLOOD PRESSURE: 79 MMHG | SYSTOLIC BLOOD PRESSURE: 169 MMHG

## 2019-07-15 VITALS — DIASTOLIC BLOOD PRESSURE: 77 MMHG | SYSTOLIC BLOOD PRESSURE: 118 MMHG

## 2019-07-15 VITALS — DIASTOLIC BLOOD PRESSURE: 83 MMHG | SYSTOLIC BLOOD PRESSURE: 138 MMHG

## 2019-07-15 VITALS — DIASTOLIC BLOOD PRESSURE: 80 MMHG | SYSTOLIC BLOOD PRESSURE: 120 MMHG

## 2019-07-15 VITALS — DIASTOLIC BLOOD PRESSURE: 90 MMHG | SYSTOLIC BLOOD PRESSURE: 154 MMHG

## 2019-07-15 VITALS — SYSTOLIC BLOOD PRESSURE: 113 MMHG | DIASTOLIC BLOOD PRESSURE: 73 MMHG

## 2019-07-15 VITALS — SYSTOLIC BLOOD PRESSURE: 112 MMHG | DIASTOLIC BLOOD PRESSURE: 80 MMHG

## 2019-07-15 VITALS — DIASTOLIC BLOOD PRESSURE: 86 MMHG | SYSTOLIC BLOOD PRESSURE: 165 MMHG

## 2019-07-15 VITALS — SYSTOLIC BLOOD PRESSURE: 129 MMHG | DIASTOLIC BLOOD PRESSURE: 72 MMHG

## 2019-07-15 VITALS — DIASTOLIC BLOOD PRESSURE: 53 MMHG | SYSTOLIC BLOOD PRESSURE: 109 MMHG

## 2019-07-15 VITALS — SYSTOLIC BLOOD PRESSURE: 136 MMHG | DIASTOLIC BLOOD PRESSURE: 73 MMHG

## 2019-07-15 VITALS — SYSTOLIC BLOOD PRESSURE: 138 MMHG | DIASTOLIC BLOOD PRESSURE: 83 MMHG

## 2019-07-15 VITALS — DIASTOLIC BLOOD PRESSURE: 78 MMHG | SYSTOLIC BLOOD PRESSURE: 117 MMHG

## 2019-07-15 VITALS — DIASTOLIC BLOOD PRESSURE: 70 MMHG | SYSTOLIC BLOOD PRESSURE: 127 MMHG

## 2019-07-15 VITALS — DIASTOLIC BLOOD PRESSURE: 53 MMHG | SYSTOLIC BLOOD PRESSURE: 99 MMHG

## 2019-07-15 VITALS — DIASTOLIC BLOOD PRESSURE: 66 MMHG | SYSTOLIC BLOOD PRESSURE: 108 MMHG

## 2019-07-15 VITALS — DIASTOLIC BLOOD PRESSURE: 112 MMHG | SYSTOLIC BLOOD PRESSURE: 161 MMHG

## 2019-07-15 VITALS — SYSTOLIC BLOOD PRESSURE: 138 MMHG | DIASTOLIC BLOOD PRESSURE: 63 MMHG

## 2019-07-15 VITALS — DIASTOLIC BLOOD PRESSURE: 59 MMHG | SYSTOLIC BLOOD PRESSURE: 89 MMHG

## 2019-07-15 VITALS — SYSTOLIC BLOOD PRESSURE: 124 MMHG | DIASTOLIC BLOOD PRESSURE: 75 MMHG

## 2019-07-15 VITALS — DIASTOLIC BLOOD PRESSURE: 88 MMHG | SYSTOLIC BLOOD PRESSURE: 147 MMHG

## 2019-07-15 VITALS — SYSTOLIC BLOOD PRESSURE: 159 MMHG | DIASTOLIC BLOOD PRESSURE: 86 MMHG

## 2019-07-15 VITALS — SYSTOLIC BLOOD PRESSURE: 129 MMHG | DIASTOLIC BLOOD PRESSURE: 75 MMHG

## 2019-07-15 VITALS — SYSTOLIC BLOOD PRESSURE: 131 MMHG | DIASTOLIC BLOOD PRESSURE: 69 MMHG

## 2019-07-15 VITALS — DIASTOLIC BLOOD PRESSURE: 67 MMHG | SYSTOLIC BLOOD PRESSURE: 119 MMHG

## 2019-07-15 VITALS — SYSTOLIC BLOOD PRESSURE: 119 MMHG | DIASTOLIC BLOOD PRESSURE: 75 MMHG

## 2019-07-15 VITALS — DIASTOLIC BLOOD PRESSURE: 73 MMHG | SYSTOLIC BLOOD PRESSURE: 101 MMHG

## 2019-07-15 VITALS — DIASTOLIC BLOOD PRESSURE: 73 MMHG | SYSTOLIC BLOOD PRESSURE: 110 MMHG

## 2019-07-15 VITALS — SYSTOLIC BLOOD PRESSURE: 145 MMHG | DIASTOLIC BLOOD PRESSURE: 83 MMHG

## 2019-07-15 VITALS — DIASTOLIC BLOOD PRESSURE: 60 MMHG | SYSTOLIC BLOOD PRESSURE: 115 MMHG

## 2019-07-15 VITALS — SYSTOLIC BLOOD PRESSURE: 146 MMHG | DIASTOLIC BLOOD PRESSURE: 53 MMHG

## 2019-07-15 VITALS — SYSTOLIC BLOOD PRESSURE: 169 MMHG | DIASTOLIC BLOOD PRESSURE: 79 MMHG

## 2019-07-15 VITALS — SYSTOLIC BLOOD PRESSURE: 120 MMHG | DIASTOLIC BLOOD PRESSURE: 74 MMHG

## 2019-07-15 VITALS — SYSTOLIC BLOOD PRESSURE: 90 MMHG | DIASTOLIC BLOOD PRESSURE: 61 MMHG

## 2019-07-15 VITALS — SYSTOLIC BLOOD PRESSURE: 118 MMHG | DIASTOLIC BLOOD PRESSURE: 69 MMHG

## 2019-07-15 VITALS — SYSTOLIC BLOOD PRESSURE: 113 MMHG | DIASTOLIC BLOOD PRESSURE: 78 MMHG

## 2019-07-15 VITALS — DIASTOLIC BLOOD PRESSURE: 112 MMHG | SYSTOLIC BLOOD PRESSURE: 156 MMHG

## 2019-07-15 VITALS — DIASTOLIC BLOOD PRESSURE: 99 MMHG | SYSTOLIC BLOOD PRESSURE: 155 MMHG

## 2019-07-15 VITALS — SYSTOLIC BLOOD PRESSURE: 117 MMHG | DIASTOLIC BLOOD PRESSURE: 86 MMHG

## 2019-07-15 VITALS — SYSTOLIC BLOOD PRESSURE: 121 MMHG | DIASTOLIC BLOOD PRESSURE: 75 MMHG

## 2019-07-15 VITALS — DIASTOLIC BLOOD PRESSURE: 73 MMHG | SYSTOLIC BLOOD PRESSURE: 123 MMHG

## 2019-07-15 LAB
ANION GAP SERPL CALCULATED.3IONS-SCNC: 12.4 MMOL/L (ref 8–16)
BASOPHILS # BLD AUTO: 0.1 K/UL (ref 0–0.22)
BASOPHILS NFR BLD AUTO: 0.9 % (ref 0–2)
BUN SERPL-MCNC: 18 MG/DL (ref 7–18)
CHLORIDE SERPL-SCNC: 105 MMOL/L (ref 98–107)
CO2 SERPL-SCNC: 25.5 MMOL/L (ref 21–32)
CREAT SERPL-MCNC: 1.8 MG/DL (ref 0.7–1.3)
EOSINOPHIL # BLD AUTO: 0.3 K/UL (ref 0–0.4)
EOSINOPHIL NFR BLD AUTO: 4 % (ref 0–4)
ERYTHROCYTE [DISTWIDTH] IN BLOOD BY AUTOMATED COUNT: 15.3 % (ref 11.6–13.7)
GFR SERPL CREATININE-BSD FRML MDRD: (no result) ML/MIN (ref 90–?)
GLUCOSE SERPL-MCNC: 167 MG/DL (ref 74–106)
HCT VFR BLD AUTO: 36.7 % (ref 36–52)
HGB BLD-MCNC: 11.9 G/DL (ref 12–18)
LYMPHOCYTES # BLD AUTO: 1.1 K/UL (ref 2–11.5)
LYMPHOCYTES NFR BLD AUTO: 13.9 % (ref 20.5–51.1)
MAGNESIUM SERPL-MCNC: 1.9 MG/DL (ref 1.8–2.4)
MCH RBC QN AUTO: 30 PG (ref 27–31)
MCHC RBC AUTO-ENTMCNC: 33 G/DL (ref 33–37)
MCV RBC AUTO: 90.8 FL (ref 80–94)
MONOCYTES # BLD AUTO: 0.4 K/UL (ref 0.8–1)
MONOCYTES NFR BLD AUTO: 5.7 % (ref 1.7–9.3)
NEUTROPHILS # BLD AUTO: 5.8 K/UL (ref 1.8–7.7)
NEUTROPHILS NFR BLD AUTO: 75.5 % (ref 42.2–75.2)
PHOSPHATE SERPL-MCNC: 4.2 MG/DL (ref 2.5–4.9)
PLATELET # BLD AUTO: 209 K/UL (ref 140–450)
POTASSIUM SERPL-SCNC: 3.9 MMOL/L (ref 3.5–5.1)
PROTHROMBIN TIME: 10 SECS (ref 10.8–13.4)
RBC # BLD AUTO: 4.04 MIL/UL (ref 4.2–6.1)
SODIUM SERPL-SCNC: 139 MMOL/L (ref 136–145)
WBC # BLD AUTO: 7.7 K/UL (ref 4.8–10.8)

## 2019-07-15 PROCEDURE — B548ZZA ULTRASONOGRAPHY OF SUPERIOR VENA CAVA, GUIDANCE: ICD-10-PCS

## 2019-07-15 PROCEDURE — 02HV33Z INSERTION OF INFUSION DEVICE INTO SUPERIOR VENA CAVA, PERCUTANEOUS APPROACH: ICD-10-PCS

## 2019-07-15 RX ADMIN — Medication SCH DEV: at 16:39

## 2019-07-15 RX ADMIN — SODIUM CHLORIDE PRN MLS/HR: 9 INJECTION, SOLUTION INTRAVENOUS at 22:02

## 2019-07-15 RX ADMIN — IPRATROPIUM BROMIDE AND ALBUTEROL SULFATE SCH ML: .5; 3 SOLUTION RESPIRATORY (INHALATION) at 23:36

## 2019-07-15 RX ADMIN — DEXTROSE SCH MLS/HR: 50 INJECTION, SOLUTION INTRAVENOUS at 15:51

## 2019-07-15 RX ADMIN — IPRATROPIUM BROMIDE AND ALBUTEROL SULFATE SCH ML: .5; 3 SOLUTION RESPIRATORY (INHALATION) at 19:24

## 2019-07-15 RX ADMIN — Medication SCH DEV: at 11:12

## 2019-07-15 RX ADMIN — ATORVASTATIN CALCIUM SCH MG: 20 TABLET, FILM COATED ORAL at 09:11

## 2019-07-15 RX ADMIN — PROPOFOL PRN MLS/HR: 10 INJECTION, EMULSION INTRAVENOUS at 06:44

## 2019-07-15 RX ADMIN — INSULIN LISPRO PRN UNITS: 100 INJECTION, SOLUTION INTRAVENOUS; SUBCUTANEOUS at 06:49

## 2019-07-15 RX ADMIN — Medication SCH DEV: at 20:51

## 2019-07-15 RX ADMIN — Medication SCH DEV: at 06:48

## 2019-07-15 RX ADMIN — Medication SCH MG: at 09:11

## 2019-07-15 RX ADMIN — DORZOLAMIDE HYDROCHLORIDE SCH ML: 20 SOLUTION OPHTHALMIC at 20:54

## 2019-07-15 RX ADMIN — IPRATROPIUM BROMIDE AND ALBUTEROL SULFATE SCH ML: .5; 3 SOLUTION RESPIRATORY (INHALATION) at 11:14

## 2019-07-15 RX ADMIN — PROPOFOL PRN MLS/HR: 10 INJECTION, EMULSION INTRAVENOUS at 23:53

## 2019-07-15 RX ADMIN — PROPOFOL PRN MLS/HR: 10 INJECTION, EMULSION INTRAVENOUS at 22:49

## 2019-07-15 RX ADMIN — BUDESONIDE SCH MG: 0.25 SUSPENSION RESPIRATORY (INHALATION) at 07:19

## 2019-07-15 RX ADMIN — Medication SCH MG: at 20:53

## 2019-07-15 RX ADMIN — DEXTROSE AND SODIUM CHLORIDE SCH MLS/HR: 5; .9 INJECTION, SOLUTION INTRAVENOUS at 02:22

## 2019-07-15 RX ADMIN — HEPARIN SODIUM SCH MLS/HR: 5000 INJECTION, SOLUTION INTRAVENOUS at 12:59

## 2019-07-15 RX ADMIN — PROPOFOL PRN MLS/HR: 10 INJECTION, EMULSION INTRAVENOUS at 21:59

## 2019-07-15 RX ADMIN — DEXTROSE SCH MLS/HR: 50 INJECTION, SOLUTION INTRAVENOUS at 06:45

## 2019-07-15 RX ADMIN — PANTOPRAZOLE SODIUM SCH MG: 40 INJECTION, POWDER, FOR SOLUTION INTRAVENOUS at 09:11

## 2019-07-15 RX ADMIN — DEXTROSE SCH MLS/HR: 50 INJECTION, SOLUTION INTRAVENOUS at 20:54

## 2019-07-15 RX ADMIN — PROPOFOL PRN MLS/HR: 10 INJECTION, EMULSION INTRAVENOUS at 12:56

## 2019-07-15 RX ADMIN — BUDESONIDE SCH MG: 0.25 SUSPENSION RESPIRATORY (INHALATION) at 19:24

## 2019-07-15 RX ADMIN — IPRATROPIUM BROMIDE AND ALBUTEROL SULFATE SCH ML: .5; 3 SOLUTION RESPIRATORY (INHALATION) at 03:38

## 2019-07-15 RX ADMIN — PROPOFOL PRN MLS/HR: 10 INJECTION, EMULSION INTRAVENOUS at 19:48

## 2019-07-15 RX ADMIN — DEXTROSE SCH MLS/HR: 50 INJECTION, SOLUTION INTRAVENOUS at 23:33

## 2019-07-15 RX ADMIN — DOCUSATE SODIUM SCH MG: 100 CAPSULE, LIQUID FILLED ORAL at 20:53

## 2019-07-15 RX ADMIN — PROPOFOL PRN MLS/HR: 10 INJECTION, EMULSION INTRAVENOUS at 03:00

## 2019-07-15 RX ADMIN — HEPARIN SODIUM SCH MLS/HR: 5000 INJECTION, SOLUTION INTRAVENOUS at 22:27

## 2019-07-15 RX ADMIN — DORZOLAMIDE HYDROCHLORIDE SCH ML: 20 SOLUTION OPHTHALMIC at 09:14

## 2019-07-15 RX ADMIN — INSULIN HUMAN PRN MLS/HR: 100 INJECTION, SOLUTION PARENTERAL at 09:13

## 2019-07-15 RX ADMIN — NOREPINEPHRINE BITARTRATE SCH MLS/HR: 1 INJECTION INTRAVENOUS at 10:42

## 2019-07-15 RX ADMIN — SODIUM CHLORIDE PRN MLS/HR: 9 INJECTION, SOLUTION INTRAVENOUS at 06:42

## 2019-07-15 RX ADMIN — IPRATROPIUM BROMIDE AND ALBUTEROL SULFATE SCH ML: .5; 3 SOLUTION RESPIRATORY (INHALATION) at 07:17

## 2019-07-15 RX ADMIN — DEXTROSE AND SODIUM CHLORIDE SCH MLS/HR: 5; .9 INJECTION, SOLUTION INTRAVENOUS at 15:52

## 2019-07-15 RX ADMIN — PROPOFOL PRN MLS/HR: 10 INJECTION, EMULSION INTRAVENOUS at 23:08

## 2019-07-15 RX ADMIN — DOCUSATE SODIUM SCH MG: 100 CAPSULE, LIQUID FILLED ORAL at 09:11

## 2019-07-15 RX ADMIN — AMIODARONE HYDROCHLORIDE SCH MG: 200 TABLET ORAL at 20:54

## 2019-07-15 RX ADMIN — INSULIN HUMAN PRN MLS/HR: 100 INJECTION, SOLUTION PARENTERAL at 21:32

## 2019-07-15 RX ADMIN — PROPOFOL PRN MLS/HR: 10 INJECTION, EMULSION INTRAVENOUS at 17:42

## 2019-07-15 RX ADMIN — IPRATROPIUM BROMIDE AND ALBUTEROL SULFATE SCH ML: .5; 3 SOLUTION RESPIRATORY (INHALATION) at 16:20

## 2019-07-15 NOTE — NUR
DR. AMAYA MADE AWARE OF PT'S INCREASED BLOOD PRESSURE 161/111 AND RR IN 40'S. TEMP 100.4. 
DR. AMAYA AT BEDSIDE EXAMINING PT. WILL FOLLOW UP ON ORDERS.

## 2019-07-15 NOTE — NUR
WOUND CARE EVALUATION FOR LOW SAMANTHA SCALE AT RISK, PRESSURE INJURY PREVENTION INTERVENTIONS 
IN PLACE. SKIN ASSESSMENT DONE WITH PRIMARY RN, NO OPEN ACTIVE WOUNDS, XEROSIS TO UPPER AND 
LOWER EXTREMITIES.

RECOMMENDATIONS:

-PLEASE APPLY HYDRA GUARD  TO BILATERAL UPPER AND LOWER EXTREMITIES BID AND LEAVE IT OPEN TO 
AIR

-APPLY HYDRA GUARD  TO SACRALCOCCYX BID AND LEAVE IT OPEN TO AIR AS PREVENTION

-TURN AND REPOSITION PATIENT Q 2H 

-ASSESS AND MONITOR SKIN CONDITION DURING POSITION CHANGE

-OFFLOAD BILATERAL HEELS BY PLACING PILLOWS UNDER CALVES AT ALL TIMES, UNLESS OTHERWISE 
CONTRAINDICATED

-PRESSURE REDISTRIBUTION BY PLACING PILLOWS AND OFFLOADING SACRALCOCCYX

-KEEP SKIN CLEAN AND DRY AT ALL TIMES.

## 2019-07-15 NOTE — NUR
VS REMAINS STABLE. SINUS ARRHYTHMIA ON MONITOR. RESPIRATIONS ARE EVEN AND UNLABORED. PT DOES 
NOT APPEAR TO BE EXPERIENCING ANY DISCOMFORT AT THIS TIME. FLACC 0. RASS -4. ALL SAFETY 
PRECAUTIONS ARE IN PLACE. PICC LINE IN USE AT THIS TIME. ALL DRIPS ARE LABELED. WILL 
CONTINUE TO MONITOR PT.

## 2019-07-15 NOTE — NUR
PT SEEN AND EXAMINED BY DR. JONES. DR. JONES SPOKE WITH PT'S DAUGHTER-IN-LAW SWAPNIL 
REGARDING PT'S CONDITION. WILL FOLLOW UP ON ORDERS.

## 2019-07-15 NOTE — NUR
VAP ORAL CARE GIVEN. TURNED AND REPOSITIONED FOR COMFORT AND PRESSURE OFF LOADING. PT 
TOLERATED WELL. VSS. WILL CONTINUE TO MONITOR.

## 2019-07-15 NOTE — NUR
APTT 43.9. BOLUS PATIENT WITH 2000 UNITS HEPARIN AND INCREASED HEPARIN DRIP TO 1240 UNITS 
INFUSING AT 12.4 ML/HR.

## 2019-07-15 NOTE — NUR
RECEIVED PT ON VENT RATE 16, , PEEP 5, O2 55%. HR 96, %. NO RESP DISTRESS. SEMI 
FOWLERS POSITION. VENT BRAKES ON. VENT PLUGGED INTO RED OUTLETS. AMBU BAG AT BEDSIDE.

## 2019-07-15 NOTE — NUR
CALLED RESIDENT DR BERRIOS TO REPORT FEEDING RESIDUALS  ML. ACCORDING TO DAY SHIFT NURSE 
DR BURNETT WANTS FEEDINGS AT 10 CC CONTINUOUSLY. ORDERED TO HOLD FEEDINGS FOR NOW AND DR BERRIOS 
WILL FOLLOW UP WITH ORDERS.

## 2019-07-15 NOTE — NUR
NO CHANGE IN PT'S CONDITION AT THIS TIME. VS STABLE. NO BM NOTED. ALL SAFETY PRECAUTIONS ARE 
IN PLACE. WILL CONTINUE TO MONITOR PT.

## 2019-07-15 NOTE — NUR
RECEIVED BEDSIDE REPORT FROM NIGHT SHIFT RN. PT IS SEDATED, RASS -4. AFEBRILE. FLACC 0. PT 
UNABLE TO MAKE NEEDS KNOW OR FOLLOW COMMANDS. A. FIB ON MONITOR. S1+S2 HEARD. PULSES 
PALPABLE IN ALL EXTREMITIES. PT IS ETT TO VENT WITH SETTINGS A/C PRVC FIO2 55%, , RR 
16, AND PEEP 5. CHEST RISE SYMMETRIC BUT BREATHING LABORED. RHONCHI HEARD BILATERALLY, 
DIMINISHED LOWER LOBES. PICC LINE TO RIGHT UPPER ARM, PERIPHERAL IV G 20 TO RIGHT FOREARM, G 
18 TO LEFT FOREARM PATENT, INTACT, ASYMPTOMATIC WITH GOOD BLOOD RETURN. PT IS ON PROPOFOL 
DRIP AT 50 MCG/KG/MIN WITH DRY WT 68KG, FENTANYL DRIP RUNNING AT 34 MCG/HR, VERSED DRIP AT 
2MG/HR, AMIODARONE DRIP AT 0.5 MG/MIN, HEPARIN DRIP  UNITS/HR, IVF D5 NS AT 75ML/HR. 
NGT IN PLACE TO RIGHT NARE, PLACEMENT CONFIRMED, RESIDUAL 100 ML. PT IS RECEIVING TF 
GLUCERNA AT 40ML/HR, WATER FLUSH 100 ML Q6H. SKIN IS DRY AND WARM TO TOUCH, DRY AND FLAKY 
BILATERAL FOOT NOTED. NELSON CATH IN PLACE DRAINING CLOUDY, DARK NARCISO URINE WITH SEDIMENTS. 
HOB AT 30 DEGREES. BED AT LOWEST POSITION LOCKED. CALL LIGHT WITHIN REACH. WILL CONTINUE TO 
MONITOR.

## 2019-07-15 NOTE — NUR
RECEIVED A CALL FROM LAB, PER MUMTAZ TB GOLD TO BE DRAWN ON 07/15/19 IN THE AFTERNOON. WILL 
COMMUNICATE TO THE NEXT SHIFT.

## 2019-07-15 NOTE — NUR
RECEIVED BEDSIDE REPORT FROM DAY SHIFT NAZIA METZ. PATIENT ETT TO VENT, SIZE 8, 24 CM AT LIP 
LINE. RT AT BEDSIDE GIVING BREATHING TREATMENT, ON AIRBORNE ISOLATION FOR RULE OUT TB, ALL 
SPUTUM SAMPLES HAVE BEEN COLLECTED AND SENT TO LAB, RESULTS ARE PENDING. HOB AT 30 DEGREES. 
NO SIGNS OF AGITATION AT THIS TIME. PROPOFOL INFUSING IN RIGHT PICC LINE AT  35 MCG/KG/MIN. 
FENTANYL INFUSING AT 68 MCG/HR AND VERSED INFUSING AT 4 MG/HR IN RIGHT UPPER ARM PICC, 
DRESSING CLEAN AND INTACT. HEPARIN INFUSING AT 1100 UNITS/ 9.6 ML/HR IN RIGHT FA 20 G 
PERIPHERAL LINE. NG TUBE IN PLACE IN RIGHT NARE. INFUSING GLUCERNA 1.2 AT 10 CC WITH WATER 
FLUSH 100 Q6H. RESIDUALS  ML. ACCORDING TO DAY SHIFT NURSE DR BURNETT WANTED TO KEEP 
FEEDINGS INFUSING AT 10 CC. NELSON CATH IN PLACE DRAINING CLEAR YELLOW URINE  ML. ORAL 
CARE PROVIDED, SCANT WHITE SECRETIONS SUCTIONED. NOTED PEELING ON BOTH FEET, NO OPEN WOUNDS. 
LEFT ARM 18 G IV SL DRESSING INTACT. BED IN LOWEST POSITION. WILL MONITOR CLOSELY.  

-------------------------------------------------------------------------------

Addendum: 07/15/19 at 2019 by Beverly Arcos RN

-------------------------------------------------------------------------------

PROPOFOL DRY WEIGHT 68 KG BEING USED

## 2019-07-15 NOTE — NUR
PT REMAINS SEDATED AT THIS TIME. RASS -4. FLACC 0. VS STABLE WITH HR BETWEEN SINUS 
ARRHYTHMIA AND AFIB. RESPIRATIONS ARE EVEN AND UNLABORED. CHEST RISE SYMMETRIC. OXYGEN 
SATURATIONS WNL. NO BM NOTED AT THIS TIME. WILL RECHECK RESIDUAL AND INCREASE FEEDING AS 
TOLERATED. ALL IV SITES REMAINS INTACT AND ASYMPTOMATIC.

## 2019-07-15 NOTE — NUR
REPORT GIVEN TO MORNING RNISAÍAS FOR CONTINUITY OF CARE. VS STABLE AT THIS TIME. ENDORSED 
PT'S BELONGINGS THAT ARE AT BEDSIDE.

## 2019-07-15 NOTE — NUR
PATIENT HAD A SHORT RUNS OF NON-SUSTAINED V-TACH AROUND 5 IN A ROW; DR. DRAKE INFORMED AND 
AWARE; NO NEW ORDER MADE.

## 2019-07-15 NOTE — NUR
RECEIVED A CALL FROM DR. CLARK, UPDATES GIVEN ON PT'S CONDITION. PER DR. CLARK, 
DISCONTINUE VANCOMYCIN. PHARMACIST MADE AWARE.

## 2019-07-15 NOTE — NUR
**** P.T. NOTES ****



D/C P.T EVAL ORDER DUE TO PATIENT IS INTUBATED PER NURSE MARTIN. PLAN: WE'LL AWAIT FOR NEW 
ORDERS WHEN HE GETS EXTUBATED AND IS MORE MEDICALLY APPROPRIATE FOR P.T. SERVICES.

## 2019-07-16 VITALS — SYSTOLIC BLOOD PRESSURE: 101 MMHG | DIASTOLIC BLOOD PRESSURE: 63 MMHG

## 2019-07-16 VITALS — DIASTOLIC BLOOD PRESSURE: 69 MMHG | SYSTOLIC BLOOD PRESSURE: 115 MMHG

## 2019-07-16 VITALS — SYSTOLIC BLOOD PRESSURE: 107 MMHG | DIASTOLIC BLOOD PRESSURE: 71 MMHG

## 2019-07-16 VITALS — DIASTOLIC BLOOD PRESSURE: 74 MMHG | SYSTOLIC BLOOD PRESSURE: 125 MMHG

## 2019-07-16 VITALS — DIASTOLIC BLOOD PRESSURE: 76 MMHG | SYSTOLIC BLOOD PRESSURE: 158 MMHG

## 2019-07-16 VITALS — DIASTOLIC BLOOD PRESSURE: 71 MMHG | SYSTOLIC BLOOD PRESSURE: 120 MMHG

## 2019-07-16 VITALS — SYSTOLIC BLOOD PRESSURE: 133 MMHG | DIASTOLIC BLOOD PRESSURE: 76 MMHG

## 2019-07-16 VITALS — SYSTOLIC BLOOD PRESSURE: 127 MMHG | DIASTOLIC BLOOD PRESSURE: 72 MMHG

## 2019-07-16 VITALS — DIASTOLIC BLOOD PRESSURE: 83 MMHG | SYSTOLIC BLOOD PRESSURE: 144 MMHG

## 2019-07-16 VITALS — SYSTOLIC BLOOD PRESSURE: 157 MMHG | DIASTOLIC BLOOD PRESSURE: 102 MMHG

## 2019-07-16 VITALS — DIASTOLIC BLOOD PRESSURE: 59 MMHG | SYSTOLIC BLOOD PRESSURE: 95 MMHG

## 2019-07-16 VITALS — DIASTOLIC BLOOD PRESSURE: 84 MMHG | SYSTOLIC BLOOD PRESSURE: 120 MMHG

## 2019-07-16 VITALS — SYSTOLIC BLOOD PRESSURE: 122 MMHG | DIASTOLIC BLOOD PRESSURE: 62 MMHG

## 2019-07-16 VITALS — DIASTOLIC BLOOD PRESSURE: 70 MMHG | SYSTOLIC BLOOD PRESSURE: 125 MMHG

## 2019-07-16 VITALS — SYSTOLIC BLOOD PRESSURE: 182 MMHG | DIASTOLIC BLOOD PRESSURE: 107 MMHG

## 2019-07-16 VITALS — DIASTOLIC BLOOD PRESSURE: 75 MMHG | SYSTOLIC BLOOD PRESSURE: 119 MMHG

## 2019-07-16 VITALS — DIASTOLIC BLOOD PRESSURE: 62 MMHG | SYSTOLIC BLOOD PRESSURE: 124 MMHG

## 2019-07-16 VITALS — SYSTOLIC BLOOD PRESSURE: 119 MMHG | DIASTOLIC BLOOD PRESSURE: 73 MMHG

## 2019-07-16 VITALS — DIASTOLIC BLOOD PRESSURE: 73 MMHG | SYSTOLIC BLOOD PRESSURE: 128 MMHG

## 2019-07-16 VITALS — DIASTOLIC BLOOD PRESSURE: 71 MMHG | SYSTOLIC BLOOD PRESSURE: 126 MMHG

## 2019-07-16 VITALS — DIASTOLIC BLOOD PRESSURE: 60 MMHG | SYSTOLIC BLOOD PRESSURE: 117 MMHG

## 2019-07-16 VITALS — SYSTOLIC BLOOD PRESSURE: 116 MMHG | DIASTOLIC BLOOD PRESSURE: 63 MMHG

## 2019-07-16 VITALS — DIASTOLIC BLOOD PRESSURE: 74 MMHG | SYSTOLIC BLOOD PRESSURE: 113 MMHG

## 2019-07-16 VITALS — SYSTOLIC BLOOD PRESSURE: 117 MMHG | DIASTOLIC BLOOD PRESSURE: 63 MMHG

## 2019-07-16 VITALS — SYSTOLIC BLOOD PRESSURE: 123 MMHG | DIASTOLIC BLOOD PRESSURE: 71 MMHG

## 2019-07-16 VITALS — SYSTOLIC BLOOD PRESSURE: 149 MMHG | DIASTOLIC BLOOD PRESSURE: 78 MMHG

## 2019-07-16 VITALS — DIASTOLIC BLOOD PRESSURE: 59 MMHG | SYSTOLIC BLOOD PRESSURE: 94 MMHG

## 2019-07-16 VITALS — SYSTOLIC BLOOD PRESSURE: 123 MMHG | DIASTOLIC BLOOD PRESSURE: 76 MMHG

## 2019-07-16 VITALS — DIASTOLIC BLOOD PRESSURE: 72 MMHG | SYSTOLIC BLOOD PRESSURE: 120 MMHG

## 2019-07-16 VITALS — DIASTOLIC BLOOD PRESSURE: 69 MMHG | SYSTOLIC BLOOD PRESSURE: 119 MMHG

## 2019-07-16 VITALS — DIASTOLIC BLOOD PRESSURE: 91 MMHG | SYSTOLIC BLOOD PRESSURE: 138 MMHG

## 2019-07-16 VITALS — SYSTOLIC BLOOD PRESSURE: 145 MMHG | DIASTOLIC BLOOD PRESSURE: 94 MMHG

## 2019-07-16 VITALS — DIASTOLIC BLOOD PRESSURE: 76 MMHG | SYSTOLIC BLOOD PRESSURE: 142 MMHG

## 2019-07-16 VITALS — SYSTOLIC BLOOD PRESSURE: 103 MMHG | DIASTOLIC BLOOD PRESSURE: 57 MMHG

## 2019-07-16 VITALS — DIASTOLIC BLOOD PRESSURE: 64 MMHG | SYSTOLIC BLOOD PRESSURE: 121 MMHG

## 2019-07-16 VITALS — SYSTOLIC BLOOD PRESSURE: 104 MMHG | DIASTOLIC BLOOD PRESSURE: 68 MMHG

## 2019-07-16 VITALS — DIASTOLIC BLOOD PRESSURE: 73 MMHG | SYSTOLIC BLOOD PRESSURE: 106 MMHG

## 2019-07-16 VITALS — SYSTOLIC BLOOD PRESSURE: 125 MMHG | DIASTOLIC BLOOD PRESSURE: 70 MMHG

## 2019-07-16 VITALS — DIASTOLIC BLOOD PRESSURE: 72 MMHG | SYSTOLIC BLOOD PRESSURE: 122 MMHG

## 2019-07-16 VITALS — DIASTOLIC BLOOD PRESSURE: 80 MMHG | SYSTOLIC BLOOD PRESSURE: 154 MMHG

## 2019-07-16 VITALS — DIASTOLIC BLOOD PRESSURE: 78 MMHG | SYSTOLIC BLOOD PRESSURE: 153 MMHG

## 2019-07-16 VITALS — DIASTOLIC BLOOD PRESSURE: 84 MMHG | SYSTOLIC BLOOD PRESSURE: 144 MMHG

## 2019-07-16 VITALS — DIASTOLIC BLOOD PRESSURE: 57 MMHG | SYSTOLIC BLOOD PRESSURE: 102 MMHG

## 2019-07-16 VITALS — SYSTOLIC BLOOD PRESSURE: 120 MMHG | DIASTOLIC BLOOD PRESSURE: 84 MMHG

## 2019-07-16 VITALS — SYSTOLIC BLOOD PRESSURE: 130 MMHG | DIASTOLIC BLOOD PRESSURE: 72 MMHG

## 2019-07-16 VITALS — DIASTOLIC BLOOD PRESSURE: 73 MMHG | SYSTOLIC BLOOD PRESSURE: 124 MMHG

## 2019-07-16 VITALS — SYSTOLIC BLOOD PRESSURE: 137 MMHG | DIASTOLIC BLOOD PRESSURE: 69 MMHG

## 2019-07-16 VITALS — DIASTOLIC BLOOD PRESSURE: 77 MMHG | SYSTOLIC BLOOD PRESSURE: 125 MMHG

## 2019-07-16 VITALS — SYSTOLIC BLOOD PRESSURE: 159 MMHG | DIASTOLIC BLOOD PRESSURE: 87 MMHG

## 2019-07-16 VITALS — SYSTOLIC BLOOD PRESSURE: 109 MMHG | DIASTOLIC BLOOD PRESSURE: 67 MMHG

## 2019-07-16 VITALS — SYSTOLIC BLOOD PRESSURE: 124 MMHG | DIASTOLIC BLOOD PRESSURE: 68 MMHG

## 2019-07-16 VITALS — DIASTOLIC BLOOD PRESSURE: 60 MMHG | SYSTOLIC BLOOD PRESSURE: 111 MMHG

## 2019-07-16 VITALS — SYSTOLIC BLOOD PRESSURE: 121 MMHG | DIASTOLIC BLOOD PRESSURE: 71 MMHG

## 2019-07-16 VITALS — SYSTOLIC BLOOD PRESSURE: 114 MMHG | DIASTOLIC BLOOD PRESSURE: 63 MMHG

## 2019-07-16 VITALS — DIASTOLIC BLOOD PRESSURE: 68 MMHG | SYSTOLIC BLOOD PRESSURE: 138 MMHG

## 2019-07-16 VITALS — DIASTOLIC BLOOD PRESSURE: 60 MMHG | SYSTOLIC BLOOD PRESSURE: 103 MMHG

## 2019-07-16 VITALS — DIASTOLIC BLOOD PRESSURE: 77 MMHG | SYSTOLIC BLOOD PRESSURE: 128 MMHG

## 2019-07-16 VITALS — SYSTOLIC BLOOD PRESSURE: 95 MMHG | DIASTOLIC BLOOD PRESSURE: 69 MMHG

## 2019-07-16 VITALS — DIASTOLIC BLOOD PRESSURE: 72 MMHG | SYSTOLIC BLOOD PRESSURE: 123 MMHG

## 2019-07-16 VITALS — DIASTOLIC BLOOD PRESSURE: 76 MMHG | SYSTOLIC BLOOD PRESSURE: 124 MMHG

## 2019-07-16 VITALS — DIASTOLIC BLOOD PRESSURE: 75 MMHG | SYSTOLIC BLOOD PRESSURE: 121 MMHG

## 2019-07-16 VITALS — SYSTOLIC BLOOD PRESSURE: 122 MMHG | DIASTOLIC BLOOD PRESSURE: 65 MMHG

## 2019-07-16 VITALS — DIASTOLIC BLOOD PRESSURE: 72 MMHG | SYSTOLIC BLOOD PRESSURE: 142 MMHG

## 2019-07-16 VITALS — SYSTOLIC BLOOD PRESSURE: 123 MMHG | DIASTOLIC BLOOD PRESSURE: 72 MMHG

## 2019-07-16 VITALS — DIASTOLIC BLOOD PRESSURE: 72 MMHG | SYSTOLIC BLOOD PRESSURE: 124 MMHG

## 2019-07-16 VITALS — DIASTOLIC BLOOD PRESSURE: 56 MMHG | SYSTOLIC BLOOD PRESSURE: 103 MMHG

## 2019-07-16 VITALS — SYSTOLIC BLOOD PRESSURE: 122 MMHG | DIASTOLIC BLOOD PRESSURE: 72 MMHG

## 2019-07-16 VITALS — DIASTOLIC BLOOD PRESSURE: 77 MMHG | SYSTOLIC BLOOD PRESSURE: 111 MMHG

## 2019-07-16 VITALS — SYSTOLIC BLOOD PRESSURE: 121 MMHG | DIASTOLIC BLOOD PRESSURE: 66 MMHG

## 2019-07-16 VITALS — SYSTOLIC BLOOD PRESSURE: 111 MMHG | DIASTOLIC BLOOD PRESSURE: 66 MMHG

## 2019-07-16 VITALS — SYSTOLIC BLOOD PRESSURE: 122 MMHG | DIASTOLIC BLOOD PRESSURE: 73 MMHG

## 2019-07-16 VITALS — SYSTOLIC BLOOD PRESSURE: 125 MMHG | DIASTOLIC BLOOD PRESSURE: 71 MMHG

## 2019-07-16 VITALS — SYSTOLIC BLOOD PRESSURE: 125 MMHG | DIASTOLIC BLOOD PRESSURE: 65 MMHG

## 2019-07-16 VITALS — SYSTOLIC BLOOD PRESSURE: 112 MMHG | DIASTOLIC BLOOD PRESSURE: 64 MMHG

## 2019-07-16 VITALS — DIASTOLIC BLOOD PRESSURE: 62 MMHG | SYSTOLIC BLOOD PRESSURE: 122 MMHG

## 2019-07-16 VITALS — SYSTOLIC BLOOD PRESSURE: 135 MMHG | DIASTOLIC BLOOD PRESSURE: 60 MMHG

## 2019-07-16 VITALS — DIASTOLIC BLOOD PRESSURE: 66 MMHG | SYSTOLIC BLOOD PRESSURE: 116 MMHG

## 2019-07-16 VITALS — SYSTOLIC BLOOD PRESSURE: 129 MMHG | DIASTOLIC BLOOD PRESSURE: 69 MMHG

## 2019-07-16 VITALS — SYSTOLIC BLOOD PRESSURE: 115 MMHG | DIASTOLIC BLOOD PRESSURE: 74 MMHG

## 2019-07-16 VITALS — DIASTOLIC BLOOD PRESSURE: 71 MMHG | SYSTOLIC BLOOD PRESSURE: 122 MMHG

## 2019-07-16 VITALS — DIASTOLIC BLOOD PRESSURE: 69 MMHG | SYSTOLIC BLOOD PRESSURE: 99 MMHG

## 2019-07-16 VITALS — SYSTOLIC BLOOD PRESSURE: 100 MMHG | DIASTOLIC BLOOD PRESSURE: 66 MMHG

## 2019-07-16 VITALS — SYSTOLIC BLOOD PRESSURE: 93 MMHG | DIASTOLIC BLOOD PRESSURE: 52 MMHG

## 2019-07-16 VITALS — SYSTOLIC BLOOD PRESSURE: 116 MMHG | DIASTOLIC BLOOD PRESSURE: 70 MMHG

## 2019-07-16 VITALS — DIASTOLIC BLOOD PRESSURE: 76 MMHG | SYSTOLIC BLOOD PRESSURE: 135 MMHG

## 2019-07-16 VITALS — SYSTOLIC BLOOD PRESSURE: 106 MMHG | DIASTOLIC BLOOD PRESSURE: 61 MMHG

## 2019-07-16 VITALS — DIASTOLIC BLOOD PRESSURE: 60 MMHG | SYSTOLIC BLOOD PRESSURE: 97 MMHG

## 2019-07-16 VITALS — SYSTOLIC BLOOD PRESSURE: 140 MMHG | DIASTOLIC BLOOD PRESSURE: 68 MMHG

## 2019-07-16 VITALS — SYSTOLIC BLOOD PRESSURE: 120 MMHG | DIASTOLIC BLOOD PRESSURE: 53 MMHG

## 2019-07-16 VITALS — SYSTOLIC BLOOD PRESSURE: 119 MMHG | DIASTOLIC BLOOD PRESSURE: 60 MMHG

## 2019-07-16 VITALS — SYSTOLIC BLOOD PRESSURE: 93 MMHG | DIASTOLIC BLOOD PRESSURE: 54 MMHG

## 2019-07-16 LAB
ANION GAP SERPL CALCULATED.3IONS-SCNC: 13.4 MMOL/L (ref 8–16)
BASOPHILS # BLD AUTO: 0 K/UL (ref 0–0.22)
BASOPHILS NFR BLD AUTO: 0.4 % (ref 0–2)
BUN SERPL-MCNC: 18 MG/DL (ref 7–18)
CHLORIDE SERPL-SCNC: 103 MMOL/L (ref 98–107)
CO2 SERPL-SCNC: 23.4 MMOL/L (ref 21–32)
CREAT SERPL-MCNC: 1.7 MG/DL (ref 0.7–1.3)
EOSINOPHIL # BLD AUTO: 0.5 K/UL (ref 0–0.4)
EOSINOPHIL NFR BLD AUTO: 6.5 % (ref 0–4)
ERYTHROCYTE [DISTWIDTH] IN BLOOD BY AUTOMATED COUNT: 15.3 % (ref 11.6–13.7)
GFR SERPL CREATININE-BSD FRML MDRD: (no result) ML/MIN (ref 90–?)
GLUCOSE SERPL-MCNC: 149 MG/DL (ref 74–106)
HCT VFR BLD AUTO: 35.9 % (ref 36–52)
HGB BLD-MCNC: 11.7 G/DL (ref 12–18)
LYMPHOCYTES # BLD AUTO: 1.6 K/UL (ref 2–11.5)
LYMPHOCYTES NFR BLD AUTO: 22.9 % (ref 20.5–51.1)
MAGNESIUM SERPL-MCNC: 1.9 MG/DL (ref 1.8–2.4)
MCH RBC QN AUTO: 30 PG (ref 27–31)
MCHC RBC AUTO-ENTMCNC: 33 G/DL (ref 33–37)
MCV RBC AUTO: 90.7 FL (ref 80–94)
MONOCYTES # BLD AUTO: 0.5 K/UL (ref 0.8–1)
MONOCYTES NFR BLD AUTO: 7.3 % (ref 1.7–9.3)
NEUTROPHILS # BLD AUTO: 4.4 K/UL (ref 1.8–7.7)
NEUTROPHILS NFR BLD AUTO: 62.9 % (ref 42.2–75.2)
PHOSPHATE SERPL-MCNC: 3.9 MG/DL (ref 2.5–4.9)
PLATELET # BLD AUTO: 177 K/UL (ref 140–450)
POTASSIUM SERPL-SCNC: 3.8 MMOL/L (ref 3.5–5.1)
RBC # BLD AUTO: 3.95 MIL/UL (ref 4.2–6.1)
SODIUM SERPL-SCNC: 136 MMOL/L (ref 136–145)
WBC # BLD AUTO: 6.9 K/UL (ref 4.8–10.8)

## 2019-07-16 RX ADMIN — METOCLOPRAMIDE PRN MG: 5 INJECTION, SOLUTION INTRAMUSCULAR; INTRAVENOUS at 23:19

## 2019-07-16 RX ADMIN — Medication SCH DEV: at 16:43

## 2019-07-16 RX ADMIN — PANTOPRAZOLE SODIUM SCH MG: 40 INJECTION, POWDER, FOR SOLUTION INTRAVENOUS at 08:26

## 2019-07-16 RX ADMIN — DOCUSATE SODIUM SCH MG: 100 CAPSULE, LIQUID FILLED ORAL at 08:26

## 2019-07-16 RX ADMIN — IPRATROPIUM BROMIDE AND ALBUTEROL SULFATE SCH ML: .5; 3 SOLUTION RESPIRATORY (INHALATION) at 06:30

## 2019-07-16 RX ADMIN — Medication SCH DEV: at 06:51

## 2019-07-16 RX ADMIN — Medication SCH DEV: at 11:29

## 2019-07-16 RX ADMIN — Medication SCH MG: at 08:26

## 2019-07-16 RX ADMIN — Medication SCH DEV: at 21:15

## 2019-07-16 RX ADMIN — HEPARIN SODIUM SCH MLS/HR: 5000 INJECTION, SOLUTION INTRAVENOUS at 10:17

## 2019-07-16 RX ADMIN — IPRATROPIUM BROMIDE AND ALBUTEROL SULFATE SCH ML: .5; 3 SOLUTION RESPIRATORY (INHALATION) at 15:04

## 2019-07-16 RX ADMIN — IPRATROPIUM BROMIDE AND ALBUTEROL SULFATE SCH ML: .5; 3 SOLUTION RESPIRATORY (INHALATION) at 03:13

## 2019-07-16 RX ADMIN — INSULIN LISPRO PRN UNITS: 100 INJECTION, SOLUTION INTRAVENOUS; SUBCUTANEOUS at 11:45

## 2019-07-16 RX ADMIN — IPRATROPIUM BROMIDE AND ALBUTEROL SULFATE SCH ML: .5; 3 SOLUTION RESPIRATORY (INHALATION) at 19:49

## 2019-07-16 RX ADMIN — DEXTROSE SCH MLS/HR: 50 INJECTION, SOLUTION INTRAVENOUS at 06:51

## 2019-07-16 RX ADMIN — INSULIN HUMAN PRN MLS/HR: 100 INJECTION, SOLUTION PARENTERAL at 23:33

## 2019-07-16 RX ADMIN — BUDESONIDE SCH MG: 0.25 SUSPENSION RESPIRATORY (INHALATION) at 06:39

## 2019-07-16 RX ADMIN — DORZOLAMIDE HYDROCHLORIDE SCH ML: 20 SOLUTION OPHTHALMIC at 08:28

## 2019-07-16 RX ADMIN — Medication SCH MG: at 21:14

## 2019-07-16 RX ADMIN — DORZOLAMIDE HYDROCHLORIDE SCH ML: 20 SOLUTION OPHTHALMIC at 21:16

## 2019-07-16 RX ADMIN — PROPOFOL PRN MLS/HR: 10 INJECTION, EMULSION INTRAVENOUS at 02:49

## 2019-07-16 RX ADMIN — Medication SCH MG: at 08:27

## 2019-07-16 RX ADMIN — DEXTROSE SCH MLS/HR: 50 INJECTION, SOLUTION INTRAVENOUS at 22:39

## 2019-07-16 RX ADMIN — DEXTROSE SCH MLS/HR: 50 INJECTION, SOLUTION INTRAVENOUS at 14:59

## 2019-07-16 RX ADMIN — AMIODARONE HYDROCHLORIDE SCH MG: 200 TABLET ORAL at 08:27

## 2019-07-16 RX ADMIN — AMIODARONE HYDROCHLORIDE SCH MG: 200 TABLET ORAL at 21:15

## 2019-07-16 RX ADMIN — DOCUSATE SODIUM SCH MG: 100 CAPSULE, LIQUID FILLED ORAL at 21:15

## 2019-07-16 RX ADMIN — IPRATROPIUM BROMIDE AND ALBUTEROL SULFATE SCH ML: .5; 3 SOLUTION RESPIRATORY (INHALATION) at 23:34

## 2019-07-16 RX ADMIN — ATORVASTATIN CALCIUM SCH MG: 20 TABLET, FILM COATED ORAL at 08:26

## 2019-07-16 RX ADMIN — IPRATROPIUM BROMIDE AND ALBUTEROL SULFATE SCH ML: .5; 3 SOLUTION RESPIRATORY (INHALATION) at 11:21

## 2019-07-16 RX ADMIN — INSULIN HUMAN PRN MLS/HR: 100 INJECTION, SOLUTION PARENTERAL at 02:47

## 2019-07-16 RX ADMIN — DEXTROSE SCH MLS/HR: 50 INJECTION, SOLUTION INTRAVENOUS at 21:15

## 2019-07-16 RX ADMIN — DEXTROSE AND SODIUM CHLORIDE SCH MLS/HR: 5; .9 INJECTION, SOLUTION INTRAVENOUS at 06:51

## 2019-07-16 RX ADMIN — BUDESONIDE SCH MG: 0.25 SUSPENSION RESPIRATORY (INHALATION) at 19:49

## 2019-07-16 NOTE — NUR
PROPOFOL STOPPED PER DR. JONES. RASS -3 AT THIS TIME. PT STILL ON VERSED AND FENTANYL 
DRIPS. VSS. WILL CONTINUE TO MONITOR.

## 2019-07-16 NOTE — NUR
D/C LEFT IV 18 G CATH INTACT AND LEFT FA 20 G CATH INTACT. PATIENT STILL HAS RIGHT UPPER ARM 
PICC AND 1 PERIPHERAL IV IN LEFT FA 20 G.

## 2019-07-16 NOTE — NUR
BED BATH GIVEN, REPOSITIONED FOR COMFORT AND OFF LOADING PRESSURE AREAS. PT TOLERATED WELL. 
NO S/SX OF ACUTE DISTRESS AT THIS TIME.

## 2019-07-16 NOTE — NUR
VENT CHECK DONE. VENT ALARMS ON AND AUDIBLE. AMBU BAG AT [facility]. SCHEDULED BREATHING TREATMENT 
ADMINISTERED. TOLERATED TX WELL, NO ADVERSE SIDE EFFECTS. SUCTIONED SMALL AMOUNT OF THICK, 
BROWN SECRETIONS. NO RESPIRATORY DISTRESS NOTED AT THIS TIME. WILL CONTINUE TO MONITOR.

## 2019-07-16 NOTE — NUR
NO CHANGE IN CONDITION AT THIS TIME. VSS. PT IS AFEBRILE. ALL SAFETY PRECAUTIONS IN PLACE. 
NO S/SX OF DISTRESS NOTED AT THIS TIME.

## 2019-07-16 NOTE — NUR
PER DR JONES TOMORROW MORNING 7/17 START WEANING TRIALS AFTER PT IS TAKEN OFF SEDATION. 
WEANING SETTING CPAP 10/5 ABG AFTER 1 HR

## 2019-07-16 NOTE — NUR
RECEIVED BEDSIDE REPORT FROM MORNING SHIFT RN, ISAÍAS. 

VSS, /72, HR 76, SATING 100%, RR 16, TEMP 97.8. ON ETT TO VENT, 

A/C  PC MODE. FIO2=30%, PINSP 16, TINSP=1.0, RR 16, PEEP 5. PMAX 35. SR ON CARDIAC MONITOR. 
LUNG SOUNDS COARSE/CLEAR ON UPPER BILATERAL LOBES, CLEAR ON LOWER BILATER LOBES. BOWEL 
SOUNDS ACTIVE X4.NGT TO FEED, GLUCERNA 1.2 AT 10CC/HR, RESIDUAL OF 150ML, AUSCULTATED FOR 
PROPER PLACEMENT. PT HAS RFA 20 GAUGE PIV, AND JHON PICC LINE INFUSING FENTANYL AT 34 
MCG/MIN, VERSED AT 3MG/MIN, AND D5NS AT 75CC/HR. PT MAINTAINS RASS -3. NONVERBAL. SKIN IS 
DRY/DRY BUT RMEAINS INTACT, FLAKING FEET, SKIN NORMAL IN COLOR.  

AIRBORNE, ASPIRATION, FALL RISK PRECAUTIONS MAINTAINED.L HOB ELEVATED ABOVE 30 DEG.

## 2019-07-16 NOTE — NUR
RECEIVED BEDSIDE REPORT FROM NIGHT SHIFT RN. PT IS SEDATED, RASS -3 W/ PROPOFOL DRIP AT 5 
MCG/KG/MIN (DRY WT 68KG), FENTANYL DRIP AT 68 MCG/HR, VERSED DRIP AT 3 MG/HR. PT IS 
AFEBRILE. FLACC 0. SINUS TACHY W/ PAC'S ON MONITOR. S1+S2 HEARD. ETT TO VENT WITH SETTINGS 
A/C PC FIO2 55%, PINSP 16, RR 16, AND PEEP 5. BREATHING EVEN AND UNLABORED. LUNGS SOUND 
COARSE, DIMINISHED LOWER LOBES. PICC LINE TO RIGHT UPPER ARM, PERIPHERAL IV G 20 TO RIGHT 
FOREARM PATENT, INTACT, ASYMPTOMATIC WITH GOOD BLOOD RETURN. IN ADDITION SEDATION DRIPS, PT 
IS ALSO ON HEPARIN DRIP AT 1240 UNITS/HR, IV FLUID D5 NS AT 75ML/HR. ABDOMEN SOFT, ROUND, 
NONTENDER, NGT IN PLACE TO RIGHT NARE, PLACEMENT CONFIRMED, RESIDUAL 50 ML. PT IS RECEIVING 
TF GLUCERNA AT 30ML/HR, WATER FLUSH 100 ML Q6H. SKIN IS DRY AND WARM TO TOUCH. PULSES 
PALPABLE IN ALL EXTREMITIES. NELSON CATH IN PLACE DRAINING URINE TO GRAVITY. HOB AT 30 
DEGREES. BED IN LOWEST POSITION LOCKED. CALL LIGHT WITHIN REACH. SAFETY PRECAUTIONS IN 
PLACE. NO SIGNS OF DISTRESS AT THIS TIME. WILL CONTINUE TO MONITOR.

## 2019-07-16 NOTE — NUR
SPOKE WITH DR JONES WENT OVER PT INFO AND SETTING SHE WOULD LIKE THE PT CHANGED BACK TO AC 
PC.  SUPERVISOR, CHARGE AND RN AWARE.

## 2019-07-16 NOTE — NUR
REPOSITIONED FOR COMFORT, BED BATH PROVIDED, NO BM, SKIN INTACT. ORAL CARE PROVIDED. 
CATHETER CARE PROVIDED.

## 2019-07-16 NOTE — NUR
PT ON VENT WITH ET SECURE AND INTACT.  PT AMBU BAG AT BEDSIDE. PT FOUND ON VENT SETTINGS AT 
THIS TIME /12/+5/40 FLOW.  FIO2 45% WITH AN SPO2 %, WILL TITRATE FIO2 TO BE 
GREATER THAN OR EQUAL TO 92%.  PEAK, PLAT AND MEAN PRESSURES NOTED IN CHART.  WILL CALL DR JONES AND VERIFY VENT MODE.

## 2019-07-16 NOTE — NUR
TUBE FEEDING RESIDUALS 180 ML. PER MICHELLE WASHINGTON TO RUN TF AT LOW RATE, AT 10 ML/HR AT THIS 
TIME. WILL CONTINUE TO MONITOR.

## 2019-07-16 NOTE — NUR
RECEIVED PATIENT ENDOTRACHEALLY INTUBATED WITH 8.0 ETT AT 26 CM AT THE GUM LINES TO 
MECHANICAL VENTILATOR AT SETTINGS: AC/PC 16, RR 16, +5, 30%. VENT CHECK DONE. VENT ALARMS ON 
AND AUDIBLE. VENT PLUGGED INTO RED OUTLET. AMBU BAG AT \[facility]\"". ORALLY SUCTIONED LARGE AMOUNT OF 
THICK, WHITE SECRETIONS. SCHEDULED BREATHING TREATMENTS ADMINISTERED. TOLERATED TREATMENTS 
WELL, NO ADVERSE SIDE EFFECTS. AIRWAY SECURE AND PATENT. SUCTIONED SMALL AMOUNT OF THICK 
BROWN/BLOOD TINGED SECRETIONS. NO RESPIRATORY DISTRESS NOTED AT THIS TIME. WILL CONTINUE TO 
MONITOR.

## 2019-07-17 VITALS — DIASTOLIC BLOOD PRESSURE: 65 MMHG | SYSTOLIC BLOOD PRESSURE: 155 MMHG

## 2019-07-17 VITALS — DIASTOLIC BLOOD PRESSURE: 82 MMHG | SYSTOLIC BLOOD PRESSURE: 151 MMHG

## 2019-07-17 VITALS — SYSTOLIC BLOOD PRESSURE: 119 MMHG | DIASTOLIC BLOOD PRESSURE: 67 MMHG

## 2019-07-17 VITALS — SYSTOLIC BLOOD PRESSURE: 135 MMHG | DIASTOLIC BLOOD PRESSURE: 76 MMHG

## 2019-07-17 VITALS — SYSTOLIC BLOOD PRESSURE: 211 MMHG | DIASTOLIC BLOOD PRESSURE: 107 MMHG

## 2019-07-17 VITALS — SYSTOLIC BLOOD PRESSURE: 164 MMHG | DIASTOLIC BLOOD PRESSURE: 90 MMHG

## 2019-07-17 VITALS — SYSTOLIC BLOOD PRESSURE: 115 MMHG | DIASTOLIC BLOOD PRESSURE: 57 MMHG

## 2019-07-17 VITALS — SYSTOLIC BLOOD PRESSURE: 141 MMHG | DIASTOLIC BLOOD PRESSURE: 75 MMHG

## 2019-07-17 VITALS — SYSTOLIC BLOOD PRESSURE: 138 MMHG | DIASTOLIC BLOOD PRESSURE: 65 MMHG

## 2019-07-17 VITALS — DIASTOLIC BLOOD PRESSURE: 73 MMHG | SYSTOLIC BLOOD PRESSURE: 172 MMHG

## 2019-07-17 VITALS — SYSTOLIC BLOOD PRESSURE: 149 MMHG | DIASTOLIC BLOOD PRESSURE: 82 MMHG

## 2019-07-17 VITALS — DIASTOLIC BLOOD PRESSURE: 63 MMHG | SYSTOLIC BLOOD PRESSURE: 115 MMHG

## 2019-07-17 VITALS — SYSTOLIC BLOOD PRESSURE: 138 MMHG | DIASTOLIC BLOOD PRESSURE: 71 MMHG

## 2019-07-17 VITALS — SYSTOLIC BLOOD PRESSURE: 121 MMHG | DIASTOLIC BLOOD PRESSURE: 63 MMHG

## 2019-07-17 VITALS — DIASTOLIC BLOOD PRESSURE: 63 MMHG | SYSTOLIC BLOOD PRESSURE: 105 MMHG

## 2019-07-17 VITALS — DIASTOLIC BLOOD PRESSURE: 72 MMHG | SYSTOLIC BLOOD PRESSURE: 133 MMHG

## 2019-07-17 VITALS — SYSTOLIC BLOOD PRESSURE: 118 MMHG | DIASTOLIC BLOOD PRESSURE: 72 MMHG

## 2019-07-17 VITALS — DIASTOLIC BLOOD PRESSURE: 57 MMHG | SYSTOLIC BLOOD PRESSURE: 101 MMHG

## 2019-07-17 VITALS — DIASTOLIC BLOOD PRESSURE: 69 MMHG | SYSTOLIC BLOOD PRESSURE: 127 MMHG

## 2019-07-17 VITALS — DIASTOLIC BLOOD PRESSURE: 54 MMHG | SYSTOLIC BLOOD PRESSURE: 103 MMHG

## 2019-07-17 VITALS — SYSTOLIC BLOOD PRESSURE: 152 MMHG | DIASTOLIC BLOOD PRESSURE: 84 MMHG

## 2019-07-17 VITALS — DIASTOLIC BLOOD PRESSURE: 54 MMHG | SYSTOLIC BLOOD PRESSURE: 100 MMHG

## 2019-07-17 VITALS — DIASTOLIC BLOOD PRESSURE: 56 MMHG | SYSTOLIC BLOOD PRESSURE: 99 MMHG

## 2019-07-17 VITALS — SYSTOLIC BLOOD PRESSURE: 115 MMHG | DIASTOLIC BLOOD PRESSURE: 63 MMHG

## 2019-07-17 VITALS — DIASTOLIC BLOOD PRESSURE: 68 MMHG | SYSTOLIC BLOOD PRESSURE: 126 MMHG

## 2019-07-17 VITALS — DIASTOLIC BLOOD PRESSURE: 93 MMHG | SYSTOLIC BLOOD PRESSURE: 166 MMHG

## 2019-07-17 VITALS — DIASTOLIC BLOOD PRESSURE: 86 MMHG | SYSTOLIC BLOOD PRESSURE: 147 MMHG

## 2019-07-17 VITALS — DIASTOLIC BLOOD PRESSURE: 77 MMHG | SYSTOLIC BLOOD PRESSURE: 139 MMHG

## 2019-07-17 VITALS — DIASTOLIC BLOOD PRESSURE: 65 MMHG | SYSTOLIC BLOOD PRESSURE: 116 MMHG

## 2019-07-17 VITALS — DIASTOLIC BLOOD PRESSURE: 52 MMHG | SYSTOLIC BLOOD PRESSURE: 104 MMHG

## 2019-07-17 VITALS — DIASTOLIC BLOOD PRESSURE: 73 MMHG | SYSTOLIC BLOOD PRESSURE: 129 MMHG

## 2019-07-17 VITALS — SYSTOLIC BLOOD PRESSURE: 150 MMHG | DIASTOLIC BLOOD PRESSURE: 81 MMHG

## 2019-07-17 VITALS — SYSTOLIC BLOOD PRESSURE: 129 MMHG | DIASTOLIC BLOOD PRESSURE: 73 MMHG

## 2019-07-17 VITALS — SYSTOLIC BLOOD PRESSURE: 137 MMHG | DIASTOLIC BLOOD PRESSURE: 82 MMHG

## 2019-07-17 VITALS — DIASTOLIC BLOOD PRESSURE: 65 MMHG | SYSTOLIC BLOOD PRESSURE: 138 MMHG

## 2019-07-17 VITALS — DIASTOLIC BLOOD PRESSURE: 82 MMHG | SYSTOLIC BLOOD PRESSURE: 142 MMHG

## 2019-07-17 VITALS — SYSTOLIC BLOOD PRESSURE: 187 MMHG | DIASTOLIC BLOOD PRESSURE: 90 MMHG

## 2019-07-17 VITALS — DIASTOLIC BLOOD PRESSURE: 70 MMHG | SYSTOLIC BLOOD PRESSURE: 113 MMHG

## 2019-07-17 VITALS — SYSTOLIC BLOOD PRESSURE: 98 MMHG | DIASTOLIC BLOOD PRESSURE: 53 MMHG

## 2019-07-17 VITALS — SYSTOLIC BLOOD PRESSURE: 180 MMHG | DIASTOLIC BLOOD PRESSURE: 78 MMHG

## 2019-07-17 VITALS — DIASTOLIC BLOOD PRESSURE: 62 MMHG | SYSTOLIC BLOOD PRESSURE: 117 MMHG

## 2019-07-17 VITALS — SYSTOLIC BLOOD PRESSURE: 104 MMHG | DIASTOLIC BLOOD PRESSURE: 52 MMHG

## 2019-07-17 VITALS — SYSTOLIC BLOOD PRESSURE: 109 MMHG | DIASTOLIC BLOOD PRESSURE: 60 MMHG

## 2019-07-17 VITALS — DIASTOLIC BLOOD PRESSURE: 76 MMHG | SYSTOLIC BLOOD PRESSURE: 135 MMHG

## 2019-07-17 VITALS — DIASTOLIC BLOOD PRESSURE: 59 MMHG | SYSTOLIC BLOOD PRESSURE: 120 MMHG

## 2019-07-17 VITALS — SYSTOLIC BLOOD PRESSURE: 95 MMHG | DIASTOLIC BLOOD PRESSURE: 52 MMHG

## 2019-07-17 VITALS — SYSTOLIC BLOOD PRESSURE: 185 MMHG | DIASTOLIC BLOOD PRESSURE: 102 MMHG

## 2019-07-17 VITALS — SYSTOLIC BLOOD PRESSURE: 148 MMHG | DIASTOLIC BLOOD PRESSURE: 86 MMHG

## 2019-07-17 VITALS — DIASTOLIC BLOOD PRESSURE: 60 MMHG | SYSTOLIC BLOOD PRESSURE: 116 MMHG

## 2019-07-17 VITALS — SYSTOLIC BLOOD PRESSURE: 127 MMHG | DIASTOLIC BLOOD PRESSURE: 69 MMHG

## 2019-07-17 VITALS — SYSTOLIC BLOOD PRESSURE: 117 MMHG | DIASTOLIC BLOOD PRESSURE: 66 MMHG

## 2019-07-17 VITALS — SYSTOLIC BLOOD PRESSURE: 142 MMHG | DIASTOLIC BLOOD PRESSURE: 72 MMHG

## 2019-07-17 VITALS — SYSTOLIC BLOOD PRESSURE: 131 MMHG | DIASTOLIC BLOOD PRESSURE: 76 MMHG

## 2019-07-17 VITALS — SYSTOLIC BLOOD PRESSURE: 101 MMHG | DIASTOLIC BLOOD PRESSURE: 58 MMHG

## 2019-07-17 VITALS — DIASTOLIC BLOOD PRESSURE: 98 MMHG | SYSTOLIC BLOOD PRESSURE: 155 MMHG

## 2019-07-17 VITALS — DIASTOLIC BLOOD PRESSURE: 58 MMHG | SYSTOLIC BLOOD PRESSURE: 116 MMHG

## 2019-07-17 VITALS — DIASTOLIC BLOOD PRESSURE: 71 MMHG | SYSTOLIC BLOOD PRESSURE: 120 MMHG

## 2019-07-17 VITALS — SYSTOLIC BLOOD PRESSURE: 111 MMHG | DIASTOLIC BLOOD PRESSURE: 59 MMHG

## 2019-07-17 VITALS — SYSTOLIC BLOOD PRESSURE: 123 MMHG | DIASTOLIC BLOOD PRESSURE: 69 MMHG

## 2019-07-17 VITALS — SYSTOLIC BLOOD PRESSURE: 115 MMHG | DIASTOLIC BLOOD PRESSURE: 66 MMHG

## 2019-07-17 VITALS — SYSTOLIC BLOOD PRESSURE: 140 MMHG | DIASTOLIC BLOOD PRESSURE: 65 MMHG

## 2019-07-17 VITALS — DIASTOLIC BLOOD PRESSURE: 52 MMHG | SYSTOLIC BLOOD PRESSURE: 95 MMHG

## 2019-07-17 VITALS — SYSTOLIC BLOOD PRESSURE: 116 MMHG | DIASTOLIC BLOOD PRESSURE: 66 MMHG

## 2019-07-17 VITALS — DIASTOLIC BLOOD PRESSURE: 78 MMHG | SYSTOLIC BLOOD PRESSURE: 180 MMHG

## 2019-07-17 VITALS — SYSTOLIC BLOOD PRESSURE: 152 MMHG | DIASTOLIC BLOOD PRESSURE: 70 MMHG

## 2019-07-17 VITALS — DIASTOLIC BLOOD PRESSURE: 76 MMHG | SYSTOLIC BLOOD PRESSURE: 159 MMHG

## 2019-07-17 VITALS — SYSTOLIC BLOOD PRESSURE: 165 MMHG | DIASTOLIC BLOOD PRESSURE: 72 MMHG

## 2019-07-17 VITALS — SYSTOLIC BLOOD PRESSURE: 136 MMHG | DIASTOLIC BLOOD PRESSURE: 18 MMHG

## 2019-07-17 VITALS — DIASTOLIC BLOOD PRESSURE: 75 MMHG | SYSTOLIC BLOOD PRESSURE: 160 MMHG

## 2019-07-17 VITALS — SYSTOLIC BLOOD PRESSURE: 186 MMHG | DIASTOLIC BLOOD PRESSURE: 103 MMHG

## 2019-07-17 VITALS — SYSTOLIC BLOOD PRESSURE: 105 MMHG | DIASTOLIC BLOOD PRESSURE: 53 MMHG

## 2019-07-17 VITALS — DIASTOLIC BLOOD PRESSURE: 87 MMHG | SYSTOLIC BLOOD PRESSURE: 150 MMHG

## 2019-07-17 VITALS — DIASTOLIC BLOOD PRESSURE: 90 MMHG | SYSTOLIC BLOOD PRESSURE: 160 MMHG

## 2019-07-17 VITALS — DIASTOLIC BLOOD PRESSURE: 70 MMHG | SYSTOLIC BLOOD PRESSURE: 129 MMHG

## 2019-07-17 VITALS — SYSTOLIC BLOOD PRESSURE: 113 MMHG | DIASTOLIC BLOOD PRESSURE: 59 MMHG

## 2019-07-17 VITALS — DIASTOLIC BLOOD PRESSURE: 72 MMHG | SYSTOLIC BLOOD PRESSURE: 118 MMHG

## 2019-07-17 VITALS — DIASTOLIC BLOOD PRESSURE: 95 MMHG | SYSTOLIC BLOOD PRESSURE: 165 MMHG

## 2019-07-17 VITALS — SYSTOLIC BLOOD PRESSURE: 98 MMHG | DIASTOLIC BLOOD PRESSURE: 51 MMHG

## 2019-07-17 VITALS — DIASTOLIC BLOOD PRESSURE: 66 MMHG | SYSTOLIC BLOOD PRESSURE: 127 MMHG

## 2019-07-17 VITALS — DIASTOLIC BLOOD PRESSURE: 81 MMHG | SYSTOLIC BLOOD PRESSURE: 138 MMHG

## 2019-07-17 VITALS — SYSTOLIC BLOOD PRESSURE: 128 MMHG | DIASTOLIC BLOOD PRESSURE: 75 MMHG

## 2019-07-17 VITALS — SYSTOLIC BLOOD PRESSURE: 102 MMHG | DIASTOLIC BLOOD PRESSURE: 62 MMHG

## 2019-07-17 VITALS — SYSTOLIC BLOOD PRESSURE: 205 MMHG | DIASTOLIC BLOOD PRESSURE: 125 MMHG

## 2019-07-17 VITALS — DIASTOLIC BLOOD PRESSURE: 50 MMHG | SYSTOLIC BLOOD PRESSURE: 100 MMHG

## 2019-07-17 VITALS — DIASTOLIC BLOOD PRESSURE: 66 MMHG | SYSTOLIC BLOOD PRESSURE: 121 MMHG

## 2019-07-17 VITALS — DIASTOLIC BLOOD PRESSURE: 64 MMHG | SYSTOLIC BLOOD PRESSURE: 127 MMHG

## 2019-07-17 VITALS — SYSTOLIC BLOOD PRESSURE: 124 MMHG | DIASTOLIC BLOOD PRESSURE: 68 MMHG

## 2019-07-17 VITALS — DIASTOLIC BLOOD PRESSURE: 95 MMHG | SYSTOLIC BLOOD PRESSURE: 192 MMHG

## 2019-07-17 VITALS — SYSTOLIC BLOOD PRESSURE: 109 MMHG | DIASTOLIC BLOOD PRESSURE: 62 MMHG

## 2019-07-17 VITALS — SYSTOLIC BLOOD PRESSURE: 210 MMHG | DIASTOLIC BLOOD PRESSURE: 112 MMHG

## 2019-07-17 VITALS — DIASTOLIC BLOOD PRESSURE: 67 MMHG | SYSTOLIC BLOOD PRESSURE: 123 MMHG

## 2019-07-17 LAB
ANION GAP SERPL CALCULATED.3IONS-SCNC: 15.9 MMOL/L (ref 8–16)
BASOPHILS # BLD AUTO: 0 K/UL (ref 0–0.22)
BASOPHILS NFR BLD AUTO: 0.3 % (ref 0–2)
BUN SERPL-MCNC: 22 MG/DL (ref 7–18)
CHLORIDE SERPL-SCNC: 102 MMOL/L (ref 98–107)
CO2 SERPL-SCNC: 21.1 MMOL/L (ref 21–32)
CREAT SERPL-MCNC: 1.5 MG/DL (ref 0.7–1.3)
EOSINOPHIL # BLD AUTO: 0 K/UL (ref 0–0.4)
EOSINOPHIL NFR BLD AUTO: 0.3 % (ref 0–4)
ERYTHROCYTE [DISTWIDTH] IN BLOOD BY AUTOMATED COUNT: 15.1 % (ref 11.6–13.7)
GFR SERPL CREATININE-BSD FRML MDRD: (no result) ML/MIN (ref 90–?)
GLUCOSE SERPL-MCNC: 245 MG/DL (ref 74–106)
HCT VFR BLD AUTO: 33.2 % (ref 36–52)
HGB BLD-MCNC: 10.8 G/DL (ref 12–18)
LYMPHOCYTES # BLD AUTO: 0.3 K/UL (ref 2–11.5)
LYMPHOCYTES NFR BLD AUTO: 3.6 % (ref 20.5–51.1)
MAGNESIUM SERPL-MCNC: 1.9 MG/DL (ref 1.8–2.4)
MCH RBC QN AUTO: 29 PG (ref 27–31)
MCHC RBC AUTO-ENTMCNC: 32 G/DL (ref 33–37)
MCV RBC AUTO: 89.8 FL (ref 80–94)
MONOCYTES # BLD AUTO: 0.3 K/UL (ref 0.8–1)
MONOCYTES NFR BLD AUTO: 4.2 % (ref 1.7–9.3)
NEUTROPHILS # BLD AUTO: 6.4 K/UL (ref 1.8–7.7)
NEUTROPHILS NFR BLD AUTO: 91.6 % (ref 42.2–75.2)
PHOSPHATE SERPL-MCNC: 3.9 MG/DL (ref 2.5–4.9)
PLATELET # BLD AUTO: 161 K/UL (ref 140–450)
POTASSIUM SERPL-SCNC: 4 MMOL/L (ref 3.5–5.1)
PROTHROMBIN TIME: 9.9 SECS (ref 10.8–13.4)
RBC # BLD AUTO: 3.7 MIL/UL (ref 4.2–6.1)
SODIUM SERPL-SCNC: 135 MMOL/L (ref 136–145)
WBC # BLD AUTO: 7 K/UL (ref 4.8–10.8)

## 2019-07-17 RX ADMIN — PANTOPRAZOLE SODIUM SCH MG: 40 INJECTION, POWDER, FOR SOLUTION INTRAVENOUS at 08:37

## 2019-07-17 RX ADMIN — Medication SCH DEV: at 12:20

## 2019-07-17 RX ADMIN — INSULIN LISPRO PRN UNITS: 100 INJECTION, SOLUTION INTRAVENOUS; SUBCUTANEOUS at 08:30

## 2019-07-17 RX ADMIN — IPRATROPIUM BROMIDE AND ALBUTEROL SULFATE SCH ML: .5; 3 SOLUTION RESPIRATORY (INHALATION) at 11:05

## 2019-07-17 RX ADMIN — IPRATROPIUM BROMIDE AND ALBUTEROL SULFATE SCH ML: .5; 3 SOLUTION RESPIRATORY (INHALATION) at 15:24

## 2019-07-17 RX ADMIN — DORZOLAMIDE HYDROCHLORIDE SCH ML: 20 SOLUTION OPHTHALMIC at 10:13

## 2019-07-17 RX ADMIN — DEXTROSE SCH MLS/HR: 50 INJECTION, SOLUTION INTRAVENOUS at 15:31

## 2019-07-17 RX ADMIN — DEXTROSE SCH MLS/HR: 50 INJECTION, SOLUTION INTRAVENOUS at 20:04

## 2019-07-17 RX ADMIN — IPRATROPIUM BROMIDE AND ALBUTEROL SULFATE SCH ML: .5; 3 SOLUTION RESPIRATORY (INHALATION) at 06:55

## 2019-07-17 RX ADMIN — METOCLOPRAMIDE PRN MG: 5 INJECTION, SOLUTION INTRAMUSCULAR; INTRAVENOUS at 09:58

## 2019-07-17 RX ADMIN — Medication SCH MG: at 08:38

## 2019-07-17 RX ADMIN — DORZOLAMIDE HYDROCHLORIDE SCH ML: 20 SOLUTION OPHTHALMIC at 20:06

## 2019-07-17 RX ADMIN — Medication SCH DEV: at 08:24

## 2019-07-17 RX ADMIN — INSULIN HUMAN PRN MLS/HR: 100 INJECTION, SOLUTION PARENTERAL at 09:57

## 2019-07-17 RX ADMIN — Medication SCH DEV: at 16:30

## 2019-07-17 RX ADMIN — ATORVASTATIN CALCIUM SCH MG: 20 TABLET, FILM COATED ORAL at 08:38

## 2019-07-17 RX ADMIN — Medication SCH DEV: at 20:04

## 2019-07-17 RX ADMIN — IPRATROPIUM BROMIDE AND ALBUTEROL SULFATE SCH ML: .5; 3 SOLUTION RESPIRATORY (INHALATION) at 19:28

## 2019-07-17 RX ADMIN — DEXTROSE AND SODIUM CHLORIDE SCH MLS/HR: 5; .9 INJECTION, SOLUTION INTRAVENOUS at 02:40

## 2019-07-17 RX ADMIN — IPRATROPIUM BROMIDE AND ALBUTEROL SULFATE SCH ML: .5; 3 SOLUTION RESPIRATORY (INHALATION) at 03:15

## 2019-07-17 RX ADMIN — BUDESONIDE SCH MG: 0.25 SUSPENSION RESPIRATORY (INHALATION) at 19:28

## 2019-07-17 RX ADMIN — Medication SCH MG: at 20:35

## 2019-07-17 RX ADMIN — DEXTROSE SCH MLS/HR: 50 INJECTION, SOLUTION INTRAVENOUS at 23:53

## 2019-07-17 RX ADMIN — DEXTROSE SCH MLS/HR: 50 INJECTION, SOLUTION INTRAVENOUS at 08:18

## 2019-07-17 RX ADMIN — AMIODARONE HYDROCHLORIDE SCH MG: 200 TABLET ORAL at 20:05

## 2019-07-17 RX ADMIN — INSULIN LISPRO PRN UNITS: 100 INJECTION, SOLUTION INTRAVENOUS; SUBCUTANEOUS at 12:37

## 2019-07-17 RX ADMIN — DOCUSATE SODIUM SCH MG: 100 CAPSULE, LIQUID FILLED ORAL at 20:05

## 2019-07-17 RX ADMIN — IPRATROPIUM BROMIDE AND ALBUTEROL SULFATE SCH ML: .5; 3 SOLUTION RESPIRATORY (INHALATION) at 23:29

## 2019-07-17 RX ADMIN — DOCUSATE SODIUM SCH MG: 100 CAPSULE, LIQUID FILLED ORAL at 08:37

## 2019-07-17 RX ADMIN — BUDESONIDE SCH MG: 0.25 SUSPENSION RESPIRATORY (INHALATION) at 07:03

## 2019-07-17 RX ADMIN — AMIODARONE HYDROCHLORIDE SCH MG: 200 TABLET ORAL at 08:37

## 2019-07-17 NOTE — NUR
REC'D PT ON CARESCAPE VENT SETTINGS PC 16 RR 16 PEEP 5 ITIME 1.00 FIO2 40% ALARMS ON AND 
AUDIBLE AND AMBU BAG AT SIDE OF VENT AND VENT IS PLUGGED INTO RED OUTLET, I\L TXS GIVEN WITH 
DUONEB 3ML AND PULMICORT 0.25MG WITH NO ADVERSE REACTION POST TX, B\S ARE COARSE 
BILATERALLY, SXN PT SMALL AMT OF THICK BROWN SECRETIONS, PT IS ORALLY INTUBATED WITH 8.0 ET 
TUBE AT 26CM AT MIDLINE AND SKIN INTEGRITY IS INTACT. PT IS RESTING WITH NO SIGNS OF 
DISTRESS NOTED

## 2019-07-17 NOTE — NUR
REPORTED CRITICAL LAB VALUES TO DR. MUSE ( CREAT 12.5, K 5.7, BUN 83). WILL FOLLOW UP WITH 
ORDERS/CONSULT.

## 2019-07-17 NOTE — NUR
PATIENT TACHYPNEIC, TACHYCARDIC, PRESENTS WITH INCREASED WOB/RETRACTIONS, BLOOD PRESSURE 
ELEVATED AND DESATURATING. INCREASED FIO2 TO 60%. BREATHING TREATMENT ADMINISTERED. DR. MEJIA PAGED. ABG DRAWN WITHOUT INCIDENT. WILL CONTINUE TO MONITOR.

## 2019-07-17 NOTE — NUR
CALLED RESIDENT DR. MEJIA INFORMED THAT PT HAS INCREASED WORK OF BREATHING/CHEST RISE. 
RT FLORINDA AT BEDSIDE ADMINISTERED BREATHING TREATMENT, CRACKLES ON BILATERAL LOBES AND 
TACHYPNEIC. PT BP WAS INCREASING 'S , HYDRALAZINE WAS GIVEN AT THIS TIME PER 
ORDER.

## 2019-07-17 NOTE — NUR
RECEIVED PATIENT WITH ENDOTRACHEAL TUBE SIZE 8.0, 25 IN THE GUM, ON PRESSURE CONTROL FIO2 
40% RATE 16 PEEP 5 PRESSURE 35, SO2 98%, OPENS EYES TO VOICE, LOCALIZES TO PAIN WITH RIGHT 
ARM, HISTORY OF CVA, VERSED 4MGS/HR, FENTANYL 34.5 MCG/HR. AND TURNED OFF AT THIS TIME. AS 
PER RT JORDAN " WILL DO CPAP IN AN HOUR". SINUS RHYTHM ON MONITOR, WITH NGT RIGHT NARES 
WITH GLUCERNA 25 CC/HR., NO RESIDUAL SO INCREASED TO 40 CC/HR., SOFT ABDOMEN, WITH NELSON 
CATHETER YELLOW URINE WITH A FEW WHITE SEDIMENTS NOTED, RIGHT HAND GAUGE 20 WITH D5NS AT 
20CC/HR., RIGHT UPPER ARM PICC 2 LUMEN AND CONNECTED TO VERSED AND FENTANYL-IV SITES 
ASYMPTOMATIC, WITH BLISTER AT RIGHT UPPER ARM AND POPPED BLISTERS BESIDE IT.

## 2019-07-17 NOTE — NUR
RESTARTED VERSED AND FENTANYL AT SAME RATE DUE TO :PATIENT TACHYPNEIC IN THE 40s, ABDOMINAL 
BREATHING NOTED

## 2019-07-17 NOTE — NUR
DR. STODDARD AT BEDSIDE AND UPDATED OF PATIENT'S STATUS. PER DR. STODDARD " DO CPAP IN THE MORNING 
AND AFTER 1 HOUR CALL ME" ASKED DR. STODDARD IF HE WANTS TO CHECK ABG AFTER 1 HOUR CPAP. PER 
PHYSICIAN "NO JUST CALL ME AFTER AN HOUR OF CPAP." INFORMED RT JORDAN PATIENT IS FOR CPAP 
TOMORROW MORNING AND CALL DR. STODDARD AFTER 1 HOUR

## 2019-07-17 NOTE — NUR
7/17/19 RD FOLLOW UP COMPLETED



PLEASE REFER TO NUTRITION ASSESSMENT UNDER CARE ACTIVITY FOR ESTIMATED NUTRITIONAL NEEDS. 



1. CONTINUE GLUCERNA 1.2 TO 65ML/HR, FWF:100ML Q 6HR

-THIS PROVIDES 1872KCAL, 110 GM PROTEIN AND 1660ML TOTAL FLUID DAILY. THIS MEETS 92% CALORIC 
NEEDS % PROTEIN NEEDS. 

-PROPOFOL TOTAL DOSE 2ML/HR PROVIDES AN ADDITIONAL 26 KCAL.

2. RD WILL F/U 2-3 DAYS; HIGH RISK. 



BETTIE ROTH RD

## 2019-07-17 NOTE — NUR
VENT CHECK DONE. VENT ALARMS ON AND AUDIBLE. AMBU BAG AT BEDSIDE. SCHEDULED BREATHING 
TREATMENT ADMINISTERED. TOLERATED TX WELL, NO ADVERSE SIDE EFFECTS. TITRATED FIO2 TO 35%, 
PULSE OX %. RN NOTIFIED OF CHANGE. NO RESPIRATORY DISTRESS NOTED AT THIS TIME.  WILL 
CONTINUE TO MONITOR.

## 2019-07-17 NOTE — NUR
DUE MEDICATIONS GIVEN, RESIDUALS  ML.  NO COVERAGE NEEDED. ORAL CARE PROVIDED. 
SLIGHT WHITE SECTIONS NOTED. PATIENT RASS-3, OPENS EYES WHEN NAME BEING CALLED. WAS ABLE TO 
OPEN MOUTH WHEN ASKED DURING ORAL CARE. DR CLARK AT BEDSIDE, UPDATE GIVEN, NO NEW ORDERS AT 
THIS TIME. PILLOWS REPOSITIONED FOR COMFORT.

## 2019-07-17 NOTE — NUR
RESIDUAL OF 30ML, TUBE FEEDING INCREASED TO 20ML/HR AT THIS TIME, AUSCULTATED FOR PROPER 
PLACEMENT.

## 2019-07-17 NOTE — NUR
RECEIVED BEDSIDE REPORT FROM DAY SHIFT NAZIA BRANNON. ON AIRBORNE PRECAUTION TO R/O TB. PATIENT 
ETT TO VENT 26 CM AT LIP LINE, SIZE 8, HOB AT 30 DEGREES. AC, FIO2 40%, RR 16, PEEP 5, P-MAX 
35. RHONCHI ON RIGHT SIDE UPPER LOBES, CLEAR ON LEFT SIDE BOTH LOBES. AFIB ON MONITOR, BP 
117/62, HR 78, O2SAT 100% RR 16 EVEN AND NOT LABORED. ON FENTANYL ON 71 MCG/HR INFUSING AT 
34.5 ML/HR AND VERSED AT 4 ML INFUSING AT 4 MG/HR IN RIGHT UPPER ARM PICC LINE, DOUBLE 
LUMEN, RASS -3, DRESSING INTACT. D5/NS INFUSING AT 10 ML/HR TKO IN RIGHT FA 20 G PERIPHERAL 
LINE, DRESSING INTACT. GLUCERNIA 1.2 INFUSING AT 65 ML/HR WITH WATER FLUSH 100 ML Q6H IN 
RIGHT NARES. NELSON CATH IN PLACE DRAINING CLEAR YELLOW URINE. NOTED SKIN TEAR/BLISTER ON 
RIGHT FA UNDER PICC, SKIN PINK, NOT BLEEDING, THANH. NOTED BILATERAL FEET PEELING, NOT OPEN. 
SIDE RAILS UP, WILL CONTINUE TO CLOSELY MONITOR.

## 2019-07-17 NOTE — NUR
PT REPOSITIONED, ABG AND STAT CHEST XRAY COMPLETED. [facility] AT BEDSIDE, MORNING BLOOD 
DRAW COMPLETED BY RN.

## 2019-07-17 NOTE — NUR
PT TO CT FOR SCAN OF CHEST BAGGED WITH 100% FIO2 BACK IN ICU 8 AT 1659 BACK ON VENT WITH 
SAME SETTINGS

## 2019-07-17 NOTE — NUR
VENT CHECK DONE. VENT ALARMS ON AND AUDIBLE. NO RESPIRATORY DISTRESS NOTED AT THIS TIME. 
FAMILY AT BEDSIDE. WILL CONTINUE TO MONITOR.

## 2019-07-17 NOTE — NUR
RECEIVED PATIENT ENDOTRACHEALLY INTUBATED WITH 8.0 ETT TO MECHANICAL VENTILATOR AT SETTINGS: 
AC/PC 16, SET RR 16, +5, 40%. VENT CHECK DONE. VENT ALARMS ON AND AUDIBLE. VENT PLUGGED INTO 
RED OUTLET. AIRWAY SECURE AND PATENT. SUCTIONED SMALL AMOUNT OF THICK, BROWN/BLOOD TINGED 
SECRETIONS. AMBU BAG AT BEDSIDE. SCHEDULED BREATHING TREATMENTS ADMINISTERED. TOLERATED 
TREATMENTS WELL, NO ADVERSE SIDE EFFECTS. NO RESPIRATORY DISTRESS NOTED AT THIS TIME. WILL 
CONTINUE TO MONITOR.

## 2019-07-17 NOTE — NUR
CALLED TO ICU 8 DUE TO PT HIGH RR 40 HEART RATE 106 AND \110 PT WAS IN DISTRESS 
CHANGED VENT MODE BACK TO PC NAZIA BRANNON AT BEDSIDE

## 2019-07-18 VITALS — DIASTOLIC BLOOD PRESSURE: 67 MMHG | SYSTOLIC BLOOD PRESSURE: 117 MMHG

## 2019-07-18 VITALS — DIASTOLIC BLOOD PRESSURE: 69 MMHG | SYSTOLIC BLOOD PRESSURE: 125 MMHG

## 2019-07-18 VITALS — DIASTOLIC BLOOD PRESSURE: 69 MMHG | SYSTOLIC BLOOD PRESSURE: 116 MMHG

## 2019-07-18 VITALS — SYSTOLIC BLOOD PRESSURE: 124 MMHG | DIASTOLIC BLOOD PRESSURE: 73 MMHG

## 2019-07-18 VITALS — SYSTOLIC BLOOD PRESSURE: 147 MMHG | DIASTOLIC BLOOD PRESSURE: 76 MMHG

## 2019-07-18 VITALS — SYSTOLIC BLOOD PRESSURE: 195 MMHG | DIASTOLIC BLOOD PRESSURE: 102 MMHG

## 2019-07-18 VITALS — SYSTOLIC BLOOD PRESSURE: 147 MMHG | DIASTOLIC BLOOD PRESSURE: 81 MMHG

## 2019-07-18 VITALS — DIASTOLIC BLOOD PRESSURE: 52 MMHG | SYSTOLIC BLOOD PRESSURE: 116 MMHG

## 2019-07-18 VITALS — DIASTOLIC BLOOD PRESSURE: 98 MMHG | SYSTOLIC BLOOD PRESSURE: 169 MMHG

## 2019-07-18 VITALS — DIASTOLIC BLOOD PRESSURE: 68 MMHG | SYSTOLIC BLOOD PRESSURE: 133 MMHG

## 2019-07-18 VITALS — SYSTOLIC BLOOD PRESSURE: 111 MMHG | DIASTOLIC BLOOD PRESSURE: 65 MMHG

## 2019-07-18 VITALS — DIASTOLIC BLOOD PRESSURE: 89 MMHG | SYSTOLIC BLOOD PRESSURE: 155 MMHG

## 2019-07-18 VITALS — DIASTOLIC BLOOD PRESSURE: 94 MMHG | SYSTOLIC BLOOD PRESSURE: 183 MMHG

## 2019-07-18 VITALS — SYSTOLIC BLOOD PRESSURE: 135 MMHG | DIASTOLIC BLOOD PRESSURE: 73 MMHG

## 2019-07-18 VITALS — SYSTOLIC BLOOD PRESSURE: 203 MMHG | DIASTOLIC BLOOD PRESSURE: 118 MMHG

## 2019-07-18 VITALS — SYSTOLIC BLOOD PRESSURE: 121 MMHG | DIASTOLIC BLOOD PRESSURE: 64 MMHG

## 2019-07-18 VITALS — DIASTOLIC BLOOD PRESSURE: 76 MMHG | SYSTOLIC BLOOD PRESSURE: 126 MMHG

## 2019-07-18 VITALS — DIASTOLIC BLOOD PRESSURE: 77 MMHG | SYSTOLIC BLOOD PRESSURE: 140 MMHG

## 2019-07-18 VITALS — DIASTOLIC BLOOD PRESSURE: 62 MMHG | SYSTOLIC BLOOD PRESSURE: 117 MMHG

## 2019-07-18 VITALS — SYSTOLIC BLOOD PRESSURE: 164 MMHG | DIASTOLIC BLOOD PRESSURE: 90 MMHG

## 2019-07-18 VITALS — SYSTOLIC BLOOD PRESSURE: 151 MMHG | DIASTOLIC BLOOD PRESSURE: 74 MMHG

## 2019-07-18 VITALS — DIASTOLIC BLOOD PRESSURE: 75 MMHG | SYSTOLIC BLOOD PRESSURE: 131 MMHG

## 2019-07-18 VITALS — SYSTOLIC BLOOD PRESSURE: 128 MMHG | DIASTOLIC BLOOD PRESSURE: 71 MMHG

## 2019-07-18 VITALS — SYSTOLIC BLOOD PRESSURE: 129 MMHG | DIASTOLIC BLOOD PRESSURE: 72 MMHG

## 2019-07-18 VITALS — DIASTOLIC BLOOD PRESSURE: 68 MMHG | SYSTOLIC BLOOD PRESSURE: 129 MMHG

## 2019-07-18 VITALS — DIASTOLIC BLOOD PRESSURE: 61 MMHG | SYSTOLIC BLOOD PRESSURE: 130 MMHG

## 2019-07-18 VITALS — DIASTOLIC BLOOD PRESSURE: 79 MMHG | SYSTOLIC BLOOD PRESSURE: 143 MMHG

## 2019-07-18 VITALS — DIASTOLIC BLOOD PRESSURE: 61 MMHG | SYSTOLIC BLOOD PRESSURE: 112 MMHG

## 2019-07-18 VITALS — DIASTOLIC BLOOD PRESSURE: 66 MMHG | SYSTOLIC BLOOD PRESSURE: 110 MMHG

## 2019-07-18 VITALS — SYSTOLIC BLOOD PRESSURE: 120 MMHG | DIASTOLIC BLOOD PRESSURE: 62 MMHG

## 2019-07-18 VITALS — SYSTOLIC BLOOD PRESSURE: 124 MMHG | DIASTOLIC BLOOD PRESSURE: 68 MMHG

## 2019-07-18 VITALS — SYSTOLIC BLOOD PRESSURE: 164 MMHG | DIASTOLIC BLOOD PRESSURE: 81 MMHG

## 2019-07-18 VITALS — DIASTOLIC BLOOD PRESSURE: 68 MMHG | SYSTOLIC BLOOD PRESSURE: 125 MMHG

## 2019-07-18 VITALS — SYSTOLIC BLOOD PRESSURE: 172 MMHG | DIASTOLIC BLOOD PRESSURE: 100 MMHG

## 2019-07-18 VITALS — SYSTOLIC BLOOD PRESSURE: 122 MMHG | DIASTOLIC BLOOD PRESSURE: 61 MMHG

## 2019-07-18 VITALS — SYSTOLIC BLOOD PRESSURE: 120 MMHG | DIASTOLIC BLOOD PRESSURE: 58 MMHG

## 2019-07-18 VITALS — DIASTOLIC BLOOD PRESSURE: 92 MMHG | SYSTOLIC BLOOD PRESSURE: 175 MMHG

## 2019-07-18 VITALS — DIASTOLIC BLOOD PRESSURE: 72 MMHG | SYSTOLIC BLOOD PRESSURE: 149 MMHG

## 2019-07-18 VITALS — SYSTOLIC BLOOD PRESSURE: 139 MMHG | DIASTOLIC BLOOD PRESSURE: 70 MMHG

## 2019-07-18 VITALS — DIASTOLIC BLOOD PRESSURE: 75 MMHG | SYSTOLIC BLOOD PRESSURE: 139 MMHG

## 2019-07-18 VITALS — SYSTOLIC BLOOD PRESSURE: 129 MMHG | DIASTOLIC BLOOD PRESSURE: 67 MMHG

## 2019-07-18 VITALS — SYSTOLIC BLOOD PRESSURE: 147 MMHG | DIASTOLIC BLOOD PRESSURE: 77 MMHG

## 2019-07-18 VITALS — SYSTOLIC BLOOD PRESSURE: 91 MMHG | DIASTOLIC BLOOD PRESSURE: 52 MMHG

## 2019-07-18 VITALS — SYSTOLIC BLOOD PRESSURE: 170 MMHG | DIASTOLIC BLOOD PRESSURE: 97 MMHG

## 2019-07-18 VITALS — DIASTOLIC BLOOD PRESSURE: 70 MMHG | SYSTOLIC BLOOD PRESSURE: 139 MMHG

## 2019-07-18 VITALS — SYSTOLIC BLOOD PRESSURE: 210 MMHG | DIASTOLIC BLOOD PRESSURE: 102 MMHG

## 2019-07-18 VITALS — SYSTOLIC BLOOD PRESSURE: 169 MMHG | DIASTOLIC BLOOD PRESSURE: 71 MMHG

## 2019-07-18 VITALS — SYSTOLIC BLOOD PRESSURE: 110 MMHG | DIASTOLIC BLOOD PRESSURE: 64 MMHG

## 2019-07-18 VITALS — SYSTOLIC BLOOD PRESSURE: 120 MMHG | DIASTOLIC BLOOD PRESSURE: 63 MMHG

## 2019-07-18 VITALS — SYSTOLIC BLOOD PRESSURE: 141 MMHG | DIASTOLIC BLOOD PRESSURE: 74 MMHG

## 2019-07-18 VITALS — SYSTOLIC BLOOD PRESSURE: 123 MMHG | DIASTOLIC BLOOD PRESSURE: 70 MMHG

## 2019-07-18 VITALS — SYSTOLIC BLOOD PRESSURE: 125 MMHG | DIASTOLIC BLOOD PRESSURE: 65 MMHG

## 2019-07-18 VITALS — SYSTOLIC BLOOD PRESSURE: 111 MMHG | DIASTOLIC BLOOD PRESSURE: 57 MMHG

## 2019-07-18 VITALS — SYSTOLIC BLOOD PRESSURE: 152 MMHG | DIASTOLIC BLOOD PRESSURE: 77 MMHG

## 2019-07-18 VITALS — SYSTOLIC BLOOD PRESSURE: 138 MMHG | DIASTOLIC BLOOD PRESSURE: 65 MMHG

## 2019-07-18 VITALS — DIASTOLIC BLOOD PRESSURE: 102 MMHG | SYSTOLIC BLOOD PRESSURE: 182 MMHG

## 2019-07-18 VITALS — DIASTOLIC BLOOD PRESSURE: 67 MMHG | SYSTOLIC BLOOD PRESSURE: 123 MMHG

## 2019-07-18 VITALS — DIASTOLIC BLOOD PRESSURE: 59 MMHG | SYSTOLIC BLOOD PRESSURE: 134 MMHG

## 2019-07-18 VITALS — SYSTOLIC BLOOD PRESSURE: 109 MMHG | DIASTOLIC BLOOD PRESSURE: 63 MMHG

## 2019-07-18 VITALS — DIASTOLIC BLOOD PRESSURE: 62 MMHG | SYSTOLIC BLOOD PRESSURE: 123 MMHG

## 2019-07-18 VITALS — DIASTOLIC BLOOD PRESSURE: 71 MMHG | SYSTOLIC BLOOD PRESSURE: 124 MMHG

## 2019-07-18 VITALS — SYSTOLIC BLOOD PRESSURE: 112 MMHG | DIASTOLIC BLOOD PRESSURE: 61 MMHG

## 2019-07-18 VITALS — SYSTOLIC BLOOD PRESSURE: 116 MMHG | DIASTOLIC BLOOD PRESSURE: 59 MMHG

## 2019-07-18 VITALS — SYSTOLIC BLOOD PRESSURE: 200 MMHG | DIASTOLIC BLOOD PRESSURE: 86 MMHG

## 2019-07-18 VITALS — SYSTOLIC BLOOD PRESSURE: 123 MMHG | DIASTOLIC BLOOD PRESSURE: 63 MMHG

## 2019-07-18 VITALS — SYSTOLIC BLOOD PRESSURE: 156 MMHG | DIASTOLIC BLOOD PRESSURE: 79 MMHG

## 2019-07-18 VITALS — SYSTOLIC BLOOD PRESSURE: 145 MMHG | DIASTOLIC BLOOD PRESSURE: 91 MMHG

## 2019-07-18 VITALS — DIASTOLIC BLOOD PRESSURE: 112 MMHG | SYSTOLIC BLOOD PRESSURE: 181 MMHG

## 2019-07-18 VITALS — DIASTOLIC BLOOD PRESSURE: 61 MMHG | SYSTOLIC BLOOD PRESSURE: 122 MMHG

## 2019-07-18 VITALS — SYSTOLIC BLOOD PRESSURE: 146 MMHG | DIASTOLIC BLOOD PRESSURE: 75 MMHG

## 2019-07-18 VITALS — DIASTOLIC BLOOD PRESSURE: 69 MMHG | SYSTOLIC BLOOD PRESSURE: 121 MMHG

## 2019-07-18 VITALS — SYSTOLIC BLOOD PRESSURE: 142 MMHG | DIASTOLIC BLOOD PRESSURE: 77 MMHG

## 2019-07-18 VITALS — DIASTOLIC BLOOD PRESSURE: 61 MMHG | SYSTOLIC BLOOD PRESSURE: 126 MMHG

## 2019-07-18 VITALS — DIASTOLIC BLOOD PRESSURE: 78 MMHG | SYSTOLIC BLOOD PRESSURE: 144 MMHG

## 2019-07-18 VITALS — DIASTOLIC BLOOD PRESSURE: 79 MMHG | SYSTOLIC BLOOD PRESSURE: 141 MMHG

## 2019-07-18 VITALS — DIASTOLIC BLOOD PRESSURE: 65 MMHG | SYSTOLIC BLOOD PRESSURE: 124 MMHG

## 2019-07-18 VITALS — SYSTOLIC BLOOD PRESSURE: 119 MMHG | DIASTOLIC BLOOD PRESSURE: 65 MMHG

## 2019-07-18 VITALS — DIASTOLIC BLOOD PRESSURE: 63 MMHG | SYSTOLIC BLOOD PRESSURE: 117 MMHG

## 2019-07-18 VITALS — DIASTOLIC BLOOD PRESSURE: 68 MMHG | SYSTOLIC BLOOD PRESSURE: 121 MMHG

## 2019-07-18 VITALS — SYSTOLIC BLOOD PRESSURE: 123 MMHG | DIASTOLIC BLOOD PRESSURE: 66 MMHG

## 2019-07-18 VITALS — SYSTOLIC BLOOD PRESSURE: 127 MMHG | DIASTOLIC BLOOD PRESSURE: 66 MMHG

## 2019-07-18 VITALS — SYSTOLIC BLOOD PRESSURE: 146 MMHG | DIASTOLIC BLOOD PRESSURE: 63 MMHG

## 2019-07-18 VITALS — DIASTOLIC BLOOD PRESSURE: 68 MMHG | SYSTOLIC BLOOD PRESSURE: 124 MMHG

## 2019-07-18 VITALS — DIASTOLIC BLOOD PRESSURE: 80 MMHG | SYSTOLIC BLOOD PRESSURE: 149 MMHG

## 2019-07-18 VITALS — DIASTOLIC BLOOD PRESSURE: 64 MMHG | SYSTOLIC BLOOD PRESSURE: 132 MMHG

## 2019-07-18 VITALS — DIASTOLIC BLOOD PRESSURE: 61 MMHG | SYSTOLIC BLOOD PRESSURE: 128 MMHG

## 2019-07-18 VITALS — DIASTOLIC BLOOD PRESSURE: 108 MMHG | SYSTOLIC BLOOD PRESSURE: 193 MMHG

## 2019-07-18 VITALS — DIASTOLIC BLOOD PRESSURE: 60 MMHG | SYSTOLIC BLOOD PRESSURE: 111 MMHG

## 2019-07-18 VITALS — SYSTOLIC BLOOD PRESSURE: 134 MMHG | DIASTOLIC BLOOD PRESSURE: 86 MMHG

## 2019-07-18 VITALS — SYSTOLIC BLOOD PRESSURE: 124 MMHG | DIASTOLIC BLOOD PRESSURE: 67 MMHG

## 2019-07-18 VITALS — SYSTOLIC BLOOD PRESSURE: 125 MMHG | DIASTOLIC BLOOD PRESSURE: 71 MMHG

## 2019-07-18 VITALS — DIASTOLIC BLOOD PRESSURE: 82 MMHG | SYSTOLIC BLOOD PRESSURE: 150 MMHG

## 2019-07-18 VITALS — DIASTOLIC BLOOD PRESSURE: 64 MMHG | SYSTOLIC BLOOD PRESSURE: 110 MMHG

## 2019-07-18 VITALS — DIASTOLIC BLOOD PRESSURE: 77 MMHG | SYSTOLIC BLOOD PRESSURE: 122 MMHG

## 2019-07-18 VITALS — SYSTOLIC BLOOD PRESSURE: 121 MMHG | DIASTOLIC BLOOD PRESSURE: 60 MMHG

## 2019-07-18 VITALS — DIASTOLIC BLOOD PRESSURE: 67 MMHG | SYSTOLIC BLOOD PRESSURE: 119 MMHG

## 2019-07-18 VITALS — SYSTOLIC BLOOD PRESSURE: 122 MMHG | DIASTOLIC BLOOD PRESSURE: 65 MMHG

## 2019-07-18 LAB
ANION GAP SERPL CALCULATED.3IONS-SCNC: 13 MMOL/L (ref 8–16)
BASOPHILS # BLD AUTO: 0 K/UL (ref 0–0.22)
BASOPHILS NFR BLD AUTO: 0.5 % (ref 0–2)
BUN SERPL-MCNC: 29 MG/DL (ref 7–18)
CHLORIDE SERPL-SCNC: 104 MMOL/L (ref 98–107)
CO2 SERPL-SCNC: 24.4 MMOL/L (ref 21–32)
CREAT SERPL-MCNC: 1.4 MG/DL (ref 0.7–1.3)
EOSINOPHIL # BLD AUTO: 0.1 K/UL (ref 0–0.4)
EOSINOPHIL NFR BLD AUTO: 3.1 % (ref 0–4)
ERYTHROCYTE [DISTWIDTH] IN BLOOD BY AUTOMATED COUNT: 14.8 % (ref 11.6–13.7)
GFR SERPL CREATININE-BSD FRML MDRD: (no result) ML/MIN (ref 90–?)
GLUCOSE SERPL-MCNC: 146 MG/DL (ref 74–106)
HCT VFR BLD AUTO: 29.9 % (ref 36–52)
HGB BLD-MCNC: 9.9 G/DL (ref 12–18)
LYMPHOCYTES # BLD AUTO: 0.6 K/UL (ref 2–11.5)
LYMPHOCYTES NFR BLD AUTO: 20.1 % (ref 20.5–51.1)
MAGNESIUM SERPL-MCNC: 2.1 MG/DL (ref 1.8–2.4)
MCH RBC QN AUTO: 30 PG (ref 27–31)
MCHC RBC AUTO-ENTMCNC: 33 G/DL (ref 33–37)
MCV RBC AUTO: 89.6 FL (ref 80–94)
MONOCYTES # BLD AUTO: 0.3 K/UL (ref 0.8–1)
MONOCYTES NFR BLD AUTO: 8.3 % (ref 1.7–9.3)
NEUTROPHILS # BLD AUTO: 2.2 K/UL (ref 1.8–7.7)
NEUTROPHILS NFR BLD AUTO: 68 % (ref 42.2–75.2)
PHOSPHATE SERPL-MCNC: 4.1 MG/DL (ref 2.5–4.9)
PLATELET # BLD AUTO: 166 K/UL (ref 140–450)
POTASSIUM SERPL-SCNC: 3.4 MMOL/L (ref 3.5–5.1)
RBC # BLD AUTO: 3.34 MIL/UL (ref 4.2–6.1)
SODIUM SERPL-SCNC: 138 MMOL/L (ref 136–145)
WBC # BLD AUTO: 3.2 K/UL (ref 4.8–10.8)

## 2019-07-18 RX ADMIN — INSULIN HUMAN PRN MLS/HR: 100 INJECTION, SOLUTION PARENTERAL at 19:52

## 2019-07-18 RX ADMIN — Medication SCH MG: at 20:04

## 2019-07-18 RX ADMIN — DEXTROSE SCH MLS/HR: 50 INJECTION, SOLUTION INTRAVENOUS at 06:36

## 2019-07-18 RX ADMIN — POTASSIUM CHLORIDE SCH MLS/HR: 200 INJECTION, SOLUTION INTRAVENOUS at 09:28

## 2019-07-18 RX ADMIN — IPRATROPIUM BROMIDE AND ALBUTEROL SULFATE SCH ML: .5; 3 SOLUTION RESPIRATORY (INHALATION) at 19:07

## 2019-07-18 RX ADMIN — BUDESONIDE SCH MG: 0.25 SUSPENSION RESPIRATORY (INHALATION) at 19:07

## 2019-07-18 RX ADMIN — POTASSIUM CHLORIDE SCH MLS/HR: 200 INJECTION, SOLUTION INTRAVENOUS at 11:42

## 2019-07-18 RX ADMIN — METOCLOPRAMIDE PRN MG: 5 INJECTION, SOLUTION INTRAMUSCULAR; INTRAVENOUS at 14:55

## 2019-07-18 RX ADMIN — ATORVASTATIN CALCIUM SCH MG: 20 TABLET, FILM COATED ORAL at 08:12

## 2019-07-18 RX ADMIN — INSULIN LISPRO PRN UNITS: 100 INJECTION, SOLUTION INTRAVENOUS; SUBCUTANEOUS at 12:00

## 2019-07-18 RX ADMIN — Medication SCH MG: at 08:14

## 2019-07-18 RX ADMIN — Medication SCH MG: at 08:13

## 2019-07-18 RX ADMIN — METOCLOPRAMIDE PRN MG: 5 INJECTION, SOLUTION INTRAMUSCULAR; INTRAVENOUS at 04:39

## 2019-07-18 RX ADMIN — INSULIN HUMAN PRN MLS/HR: 100 INJECTION, SOLUTION PARENTERAL at 23:48

## 2019-07-18 RX ADMIN — DORZOLAMIDE HYDROCHLORIDE SCH ML: 20 SOLUTION OPHTHALMIC at 20:04

## 2019-07-18 RX ADMIN — IPRATROPIUM BROMIDE AND ALBUTEROL SULFATE SCH ML: .5; 3 SOLUTION RESPIRATORY (INHALATION) at 07:01

## 2019-07-18 RX ADMIN — DORZOLAMIDE HYDROCHLORIDE SCH ML: 20 SOLUTION OPHTHALMIC at 08:16

## 2019-07-18 RX ADMIN — FUROSEMIDE SCH MG: 10 INJECTION, SOLUTION INTRAMUSCULAR; INTRAVENOUS at 08:12

## 2019-07-18 RX ADMIN — INSULIN HUMAN PRN MLS/HR: 100 INJECTION, SOLUTION PARENTERAL at 14:53

## 2019-07-18 RX ADMIN — INSULIN HUMAN PRN MLS/HR: 100 INJECTION, SOLUTION PARENTERAL at 20:22

## 2019-07-18 RX ADMIN — INSULIN HUMAN PRN MLS/HR: 100 INJECTION, SOLUTION PARENTERAL at 01:27

## 2019-07-18 RX ADMIN — IPRATROPIUM BROMIDE AND ALBUTEROL SULFATE SCH ML: .5; 3 SOLUTION RESPIRATORY (INHALATION) at 15:22

## 2019-07-18 RX ADMIN — Medication SCH DEV: at 20:01

## 2019-07-18 RX ADMIN — BUDESONIDE SCH MG: 0.25 SUSPENSION RESPIRATORY (INHALATION) at 07:01

## 2019-07-18 RX ADMIN — DEXTROSE SCH MLS/HR: 50 INJECTION, SOLUTION INTRAVENOUS at 20:04

## 2019-07-18 RX ADMIN — IPRATROPIUM BROMIDE AND ALBUTEROL SULFATE SCH ML: .5; 3 SOLUTION RESPIRATORY (INHALATION) at 23:22

## 2019-07-18 RX ADMIN — Medication SCH DEV: at 11:35

## 2019-07-18 RX ADMIN — DEXTROSE SCH MLS/HR: 50 INJECTION, SOLUTION INTRAVENOUS at 14:55

## 2019-07-18 RX ADMIN — DEXTROSE SCH MLS/HR: 50 INJECTION, SOLUTION INTRAVENOUS at 23:11

## 2019-07-18 RX ADMIN — Medication SCH DEV: at 06:39

## 2019-07-18 RX ADMIN — AMIODARONE HYDROCHLORIDE SCH MG: 200 TABLET ORAL at 20:03

## 2019-07-18 RX ADMIN — IPRATROPIUM BROMIDE AND ALBUTEROL SULFATE SCH ML: .5; 3 SOLUTION RESPIRATORY (INHALATION) at 11:11

## 2019-07-18 RX ADMIN — PANTOPRAZOLE SODIUM SCH MG: 40 INJECTION, POWDER, FOR SOLUTION INTRAVENOUS at 08:12

## 2019-07-18 RX ADMIN — DOCUSATE SODIUM SCH MG: 50 LIQUID ORAL at 08:12

## 2019-07-18 RX ADMIN — INSULIN LISPRO PRN UNITS: 100 INJECTION, SOLUTION INTRAVENOUS; SUBCUTANEOUS at 20:24

## 2019-07-18 RX ADMIN — Medication SCH DEV: at 16:30

## 2019-07-18 RX ADMIN — INSULIN HUMAN PRN MLS/HR: 100 INJECTION, SOLUTION PARENTERAL at 09:52

## 2019-07-18 RX ADMIN — AMIODARONE HYDROCHLORIDE SCH MG: 200 TABLET ORAL at 08:13

## 2019-07-18 RX ADMIN — IPRATROPIUM BROMIDE AND ALBUTEROL SULFATE SCH ML: .5; 3 SOLUTION RESPIRATORY (INHALATION) at 03:47

## 2019-07-18 RX ADMIN — INSULIN LISPRO PRN UNITS: 100 INJECTION, SOLUTION INTRAVENOUS; SUBCUTANEOUS at 17:19

## 2019-07-18 NOTE — NUR
PT IS ON CPAP, TACHYPNEIC W/ LABORED BREATHING, BUT FOLLOWS COMMANDS. SOFT WRIST RESTRAINTS 
NOT NEEDED AT THIS TIME. WILL CONTINUE TO MONITOR.

## 2019-07-18 NOTE — NUR
RECIVED PT ON VENT WITH SETTINGS AS CHARTED BREATH SOUNDS PRESENT BILAT  SCATTERD RALES SXN 
PT WITH MIN AMT OFF WHITE SECS TRACH SITE SECURE PT RESTING AMBU BAG BEDSIDE VENT PLUGGED 
INTO RED OUTLET WILL CONTINUE TO MONITOR PT ON VENT

## 2019-07-18 NOTE — NUR
VENT CHECK DONE. VENT ALARMS ON AND AUDIBLE. SCHEDULED BREATHING TREATMENT ADMINISTERED. 
TOLERATED TX WELL, NO ADVERSE SIDE EFFECTS. SUCTIONED SMALL AMOUNT OF THICK, BROWN/BLOOD 
TINGED SECRETIONS. NO RESPIRATORY DISTRESS NOTED AT THIS TIME. WILL CONTINUE TO MONITOR.

## 2019-07-18 NOTE — NUR
RECEIVED BEDSIDE REPORT FROM NIGHT SHIFT RN. PT IS SEDATED, RASS -3. AFEBRILE. FLACC 0. NSR 
ON MONITOR. S1+S2 HEARD. PULSES PALPABLE IN ALL EXTREMITIES. ETT TO VENT WITH SETTINGS A/C 
PC FIO2 30%, PINSP 16, RR 16, PEEP 5. BREATHING EVEN, UNLABORED, NO SOB NOTED. RHONCHI HEARD 
BILATERALLY, DIMINISHED LOWER LOBES.

## 2019-07-18 NOTE — NUR
continued to monitorpt onvent breath sounds present bilat scatterd rales sxn pt with mod amt 
off white secs ett secure pt quiet sedated ambu bag at bedside vent plugged into red outlet

## 2019-07-18 NOTE — NUR
VENT CHECK DONE, ALARMS ON AND AUDIBLE. SCHEDULED BREATHING TREATMENT ADMINISTERED. 
TOLERATED TX WELL, NO ADVERSE SIDE EFFECTS. NO RESPIRATORY DISTRESS NOTED AT THIS TIME. WILL 
CONTINUE TO MONITOR.

## 2019-07-18 NOTE — NUR
RECEIVED BEDSIDE REPORT FROM DAY SHIFT NAZIA METZ. ON AIRBORNE PRECAUTION TO R/O TB. PATIENT 
ETT TO VENT 26 CM AT LIP LINE, SIZE 8, HOB AT 30 DEGREES, R/T AT BEDSIDE GIVING BREATHING 
TREATMENT. AC, FIO2 35%, RR 16, PEEP 5, P-MAX 40. LUNG SOUNDS CLEAR BILATERALLY. SR ON 
MONITOR, /90, HR 87, O2SAT 100% RR 16 EVEN AND NOT LABORED TEMP 98.6 F. ON FENTANYL 
INFUSING AT 34 MCG/HR AND VERSED AT 4 ML IN RIGHT UPPER ARM PICC LINE, DOUBLE LUMEN, RASS 
-3, DRESSING INTACT. D5/NS INFUSING AT 10 ML/HR TKO INFUSING IN RIGHT UPPER ARM PICC. 
GLUCERNIA 1.2 INFUSING AT 65 ML/HR WITH WATER FLUSH 100 ML Q6H IN RIGHT NARES, RESIDUALS AT 
5 ML. NELSON CATH IN PLACE DRAINING CLEAR YELLOW URINE. NOTED 3 SKIN TEARS/BLISTER ON RIGHT 
FA UNDER PICC, SKIN PINK, NOT BLEEDING, DRESSING CLEAN/DRY AND INTACT. NOTED BILATERAL FEET 
PEELING, NOT OPEN. SIDE RAILS UP, WILL CONTINUE TO CLOSELY MONITOR.

## 2019-07-18 NOTE — NUR
VENT CHECK DONE. VENT ALARMS ON AND AUDIBLE. NO RESPIRATORY DISTRESS NOTED AT THIS TIME. 
WILL CONTINUE TO MONITOR.

## 2019-07-18 NOTE — NUR
VAP ORAL CARE GIVEN. TURNED AND REPOSITIONED FOR COMFORT AND OFF LOADING  PRESSURE AREAS. 
VSS. PT NOT IN ANY DISTRESS AT THIS TIME. WILL CONTINUE TO MONITOR.

## 2019-07-18 NOTE — NUR
ABDOMEN SOFT, ROUND, NONTENDER, W/ HYPOACTIVE BOWEL SOUNDS. NGT IN PLACE TO RIGHT NARE, 
PLACEMENT CONFIRMED. PT IS RECEIVING GLUCERNA 1.2 AT 65 ML/HR, WATER FLUSH 100 ML Q6H, 
RESIDUALS 200 ML NOTED, TUBE FEEDING HELD AT THIS TIME. PICC LINE TO RIGHT UPPER ARM AND 
PERIPHERAL IV G20 ASYMPTOMATIC, PATENT AND INTACT. PT IS ON VERSED DRIP AT 4 MG/HR AND 
FENTANYL DRIP AT 71 MCG/HR, D5NS TKO. NELSON CATH IN PLACE DRAINING CLEAR YELLOW URINE TO 
GRAVITY DRAINAGE BAG. SKIN IS DRY AND WARM TO TOUCH, LOTION APPLIED FOR DRY SKIN, SKIN TEARS 
ON BILATERAL ARMS NOTED, DRESSING CLEAN, DRY AND INTACT. BILATERAL HEELS PROTECTORS IN 
PLACE. HOB 30 DEGREES, BED IN LOWEST POSITION AND CALL LIGHT WITHIN REACH. WILL CONTINUE TO 
MONITOR. 

-------------------------------------------------------------------------------

Addendum: 07/18/19 at 1812 by Patti Gallo RN

-------------------------------------------------------------------------------

SKIN TEARS AND BLISTER TO RIGHT UPPER ARM

## 2019-07-18 NOTE — NUR
BED BATH PROVIDED, NO BM. REPOSITIONED FOR COMFORT. ORAL CARE DONE NO SECRETIONS NOTED. R/T 
AT BEDSIDE.

## 2019-07-18 NOTE — NUR
MEDICATIONS ADMINISTERED AS ORDERED. PT TOLERATED WELL. TUBE FEEDING RESIDUALS STILL 200 ML 
AT THIS TIME.

## 2019-07-18 NOTE — NUR
RESIDENT PHYSICIAN DR. BERRIOS IN TO SEE AND EXAMINE PT. UPDATES GIVEN ON PT'S CONDITION. WILL 
FOLLOW UP ON ORDERS.

## 2019-07-18 NOTE — NUR
CALLED DR BERRIOS TO  ASK IF PATIENT CAN IVF NS INSTEAD OF DEXTROSE/NS SINCE PATIENT IS ON 
TUBE FEEDINGS AND NEEDED COVERAGE FOR BLOOD SUGAR . DR BERRIOS STATED HE WILL CHANGE 
ORDER.

## 2019-07-18 NOTE — NUR
R/T AT BEDSIDE DOING ROUNDS AND GOING TO GIVE BREATHING TREATMENT. AWARE THAT O2 SAT WENT 
DOWN TO 80'S FOR FEW SECONDS.

## 2019-07-18 NOTE — NUR
ADMINISTERED NEW BAG VERSED INFUSING AT 4 ML/HR. RASS-3, V/S STABLE. 

-------------------------------------------------------------------------------

Addendum: 07/18/19 at 2101 by Beverly Arcos RN

-------------------------------------------------------------------------------

INCREASED 5 ML/HR FOR RASS-4, PATIENT MOVING HAND TO ETT.

## 2019-07-18 NOTE — NUR
SWITCHED PT OVER TO CPAP 5 WITH PS 10  PT AWAKE WILL CONTINUE TO MONITOR PT ON CPAP SEE IF 
HE CAN TOLERATE

## 2019-07-18 NOTE — NUR
RESUMED VERSED AND FENTANYL DRIPS AS PT UNABLE TO TOLERATE CPAP. PT IS TACHYPNEIC, RR HIGH 
30'S AND LABORED BREATHING NOTED. WILL CONTINUE TO MONITOR.

## 2019-07-18 NOTE — NUR
RECEIVED PATIENT ENDOTRACHEALLY INTUBATED WITH 8.0 ETT TO MECHANICAL VENTILATOR AT SETTINGS: 
AC/PC Pinsp 16, SET RR 16, +5, 35%. VENT CHECK DONE. VENT PLUGGED INTO RED OUTLET. VENT 
ALARMS ON AND AUDIBLE. AMBU BAG AT BEDSIDE. SCHEDULED BREATHING TREATMENTS ADMINISTERED. 
TOLERATED TREATMENTS WELL, NO ADVERSE SIDE EFFECTS. AIRWAY SECURE AND PATENT. SUCTIONED 
SMALL AMOUNT OF THICK, BROWN/BLOOD TINGED SECRETIONS. NO RESPIRATORY DISTRESS NOTED AT THIS 
TIME. WILL CONTINUE TO MONITOR.

## 2019-07-19 VITALS — SYSTOLIC BLOOD PRESSURE: 133 MMHG | DIASTOLIC BLOOD PRESSURE: 59 MMHG

## 2019-07-19 VITALS — SYSTOLIC BLOOD PRESSURE: 114 MMHG | DIASTOLIC BLOOD PRESSURE: 61 MMHG

## 2019-07-19 VITALS — DIASTOLIC BLOOD PRESSURE: 69 MMHG | SYSTOLIC BLOOD PRESSURE: 123 MMHG

## 2019-07-19 VITALS — SYSTOLIC BLOOD PRESSURE: 129 MMHG | DIASTOLIC BLOOD PRESSURE: 65 MMHG

## 2019-07-19 VITALS — SYSTOLIC BLOOD PRESSURE: 117 MMHG | DIASTOLIC BLOOD PRESSURE: 64 MMHG

## 2019-07-19 VITALS — SYSTOLIC BLOOD PRESSURE: 124 MMHG | DIASTOLIC BLOOD PRESSURE: 63 MMHG

## 2019-07-19 VITALS — DIASTOLIC BLOOD PRESSURE: 75 MMHG | SYSTOLIC BLOOD PRESSURE: 135 MMHG

## 2019-07-19 VITALS — SYSTOLIC BLOOD PRESSURE: 172 MMHG | DIASTOLIC BLOOD PRESSURE: 81 MMHG

## 2019-07-19 VITALS — DIASTOLIC BLOOD PRESSURE: 68 MMHG | SYSTOLIC BLOOD PRESSURE: 148 MMHG

## 2019-07-19 VITALS — DIASTOLIC BLOOD PRESSURE: 83 MMHG | SYSTOLIC BLOOD PRESSURE: 155 MMHG

## 2019-07-19 VITALS — DIASTOLIC BLOOD PRESSURE: 60 MMHG | SYSTOLIC BLOOD PRESSURE: 101 MMHG

## 2019-07-19 VITALS — SYSTOLIC BLOOD PRESSURE: 120 MMHG | DIASTOLIC BLOOD PRESSURE: 62 MMHG

## 2019-07-19 VITALS — DIASTOLIC BLOOD PRESSURE: 60 MMHG | SYSTOLIC BLOOD PRESSURE: 111 MMHG

## 2019-07-19 VITALS — SYSTOLIC BLOOD PRESSURE: 142 MMHG | DIASTOLIC BLOOD PRESSURE: 78 MMHG

## 2019-07-19 VITALS — DIASTOLIC BLOOD PRESSURE: 57 MMHG | SYSTOLIC BLOOD PRESSURE: 104 MMHG

## 2019-07-19 VITALS — DIASTOLIC BLOOD PRESSURE: 83 MMHG | SYSTOLIC BLOOD PRESSURE: 159 MMHG

## 2019-07-19 VITALS — SYSTOLIC BLOOD PRESSURE: 130 MMHG | DIASTOLIC BLOOD PRESSURE: 61 MMHG

## 2019-07-19 VITALS — SYSTOLIC BLOOD PRESSURE: 137 MMHG | DIASTOLIC BLOOD PRESSURE: 71 MMHG

## 2019-07-19 VITALS — SYSTOLIC BLOOD PRESSURE: 199 MMHG | DIASTOLIC BLOOD PRESSURE: 92 MMHG

## 2019-07-19 VITALS — DIASTOLIC BLOOD PRESSURE: 69 MMHG | SYSTOLIC BLOOD PRESSURE: 122 MMHG

## 2019-07-19 VITALS — DIASTOLIC BLOOD PRESSURE: 62 MMHG | SYSTOLIC BLOOD PRESSURE: 124 MMHG

## 2019-07-19 VITALS — DIASTOLIC BLOOD PRESSURE: 64 MMHG | SYSTOLIC BLOOD PRESSURE: 126 MMHG

## 2019-07-19 VITALS — SYSTOLIC BLOOD PRESSURE: 108 MMHG | DIASTOLIC BLOOD PRESSURE: 60 MMHG

## 2019-07-19 VITALS — DIASTOLIC BLOOD PRESSURE: 65 MMHG | SYSTOLIC BLOOD PRESSURE: 113 MMHG

## 2019-07-19 VITALS — SYSTOLIC BLOOD PRESSURE: 107 MMHG | DIASTOLIC BLOOD PRESSURE: 58 MMHG

## 2019-07-19 VITALS — DIASTOLIC BLOOD PRESSURE: 71 MMHG | SYSTOLIC BLOOD PRESSURE: 131 MMHG

## 2019-07-19 VITALS — SYSTOLIC BLOOD PRESSURE: 134 MMHG | DIASTOLIC BLOOD PRESSURE: 65 MMHG

## 2019-07-19 VITALS — SYSTOLIC BLOOD PRESSURE: 116 MMHG | DIASTOLIC BLOOD PRESSURE: 60 MMHG

## 2019-07-19 VITALS — SYSTOLIC BLOOD PRESSURE: 144 MMHG | DIASTOLIC BLOOD PRESSURE: 69 MMHG

## 2019-07-19 VITALS — SYSTOLIC BLOOD PRESSURE: 104 MMHG | DIASTOLIC BLOOD PRESSURE: 57 MMHG

## 2019-07-19 VITALS — DIASTOLIC BLOOD PRESSURE: 58 MMHG | SYSTOLIC BLOOD PRESSURE: 107 MMHG

## 2019-07-19 VITALS — DIASTOLIC BLOOD PRESSURE: 75 MMHG | SYSTOLIC BLOOD PRESSURE: 144 MMHG

## 2019-07-19 VITALS — DIASTOLIC BLOOD PRESSURE: 82 MMHG | SYSTOLIC BLOOD PRESSURE: 182 MMHG

## 2019-07-19 VITALS — DIASTOLIC BLOOD PRESSURE: 71 MMHG | SYSTOLIC BLOOD PRESSURE: 137 MMHG

## 2019-07-19 VITALS — DIASTOLIC BLOOD PRESSURE: 61 MMHG | SYSTOLIC BLOOD PRESSURE: 119 MMHG

## 2019-07-19 VITALS — SYSTOLIC BLOOD PRESSURE: 167 MMHG | DIASTOLIC BLOOD PRESSURE: 60 MMHG

## 2019-07-19 VITALS — DIASTOLIC BLOOD PRESSURE: 83 MMHG | SYSTOLIC BLOOD PRESSURE: 167 MMHG

## 2019-07-19 VITALS — DIASTOLIC BLOOD PRESSURE: 79 MMHG | SYSTOLIC BLOOD PRESSURE: 176 MMHG

## 2019-07-19 VITALS — DIASTOLIC BLOOD PRESSURE: 97 MMHG | SYSTOLIC BLOOD PRESSURE: 184 MMHG

## 2019-07-19 VITALS — SYSTOLIC BLOOD PRESSURE: 138 MMHG | DIASTOLIC BLOOD PRESSURE: 74 MMHG

## 2019-07-19 VITALS — DIASTOLIC BLOOD PRESSURE: 68 MMHG | SYSTOLIC BLOOD PRESSURE: 124 MMHG

## 2019-07-19 VITALS — SYSTOLIC BLOOD PRESSURE: 132 MMHG | DIASTOLIC BLOOD PRESSURE: 73 MMHG

## 2019-07-19 VITALS — DIASTOLIC BLOOD PRESSURE: 70 MMHG | SYSTOLIC BLOOD PRESSURE: 139 MMHG

## 2019-07-19 VITALS — DIASTOLIC BLOOD PRESSURE: 69 MMHG | SYSTOLIC BLOOD PRESSURE: 130 MMHG

## 2019-07-19 VITALS — DIASTOLIC BLOOD PRESSURE: 172 MMHG

## 2019-07-19 VITALS — SYSTOLIC BLOOD PRESSURE: 113 MMHG | DIASTOLIC BLOOD PRESSURE: 65 MMHG

## 2019-07-19 VITALS — SYSTOLIC BLOOD PRESSURE: 151 MMHG | DIASTOLIC BLOOD PRESSURE: 72 MMHG

## 2019-07-19 LAB
ANION GAP SERPL CALCULATED.3IONS-SCNC: 10.2 MMOL/L (ref 8–16)
BASOPHILS # BLD AUTO: 0 K/UL (ref 0–0.22)
BASOPHILS NFR BLD AUTO: 0.5 % (ref 0–2)
BUN SERPL-MCNC: 33 MG/DL (ref 7–18)
CHLORIDE SERPL-SCNC: 104 MMOL/L (ref 98–107)
CO2 SERPL-SCNC: 27.5 MMOL/L (ref 21–32)
CREAT SERPL-MCNC: 1.3 MG/DL (ref 0.7–1.3)
EOSINOPHIL # BLD AUTO: 0.1 K/UL (ref 0–0.4)
EOSINOPHIL NFR BLD AUTO: 2.9 % (ref 0–4)
ERYTHROCYTE [DISTWIDTH] IN BLOOD BY AUTOMATED COUNT: 14.9 % (ref 11.6–13.7)
GFR SERPL CREATININE-BSD FRML MDRD: (no result) ML/MIN (ref 90–?)
GLUCOSE SERPL-MCNC: 134 MG/DL (ref 74–106)
HCT VFR BLD AUTO: 29.1 % (ref 36–52)
HGB BLD-MCNC: 9.5 G/DL (ref 12–18)
LYMPHOCYTES # BLD AUTO: 0.6 K/UL (ref 2–11.5)
LYMPHOCYTES NFR BLD AUTO: 18.3 % (ref 20.5–51.1)
MAGNESIUM SERPL-MCNC: 2.1 MG/DL (ref 1.8–2.4)
MCH RBC QN AUTO: 29 PG (ref 27–31)
MCHC RBC AUTO-ENTMCNC: 33 G/DL (ref 33–37)
MCV RBC AUTO: 89.3 FL (ref 80–94)
MONOCYTES # BLD AUTO: 0.3 K/UL (ref 0.8–1)
MONOCYTES NFR BLD AUTO: 9.5 % (ref 1.7–9.3)
NEUTROPHILS # BLD AUTO: 2.3 K/UL (ref 1.8–7.7)
NEUTROPHILS NFR BLD AUTO: 68.8 % (ref 42.2–75.2)
PHOSPHATE SERPL-MCNC: 3.9 MG/DL (ref 2.5–4.9)
PLATELET # BLD AUTO: 183 K/UL (ref 140–450)
POTASSIUM SERPL-SCNC: 3.7 MMOL/L (ref 3.5–5.1)
RBC # BLD AUTO: 3.26 MIL/UL (ref 4.2–6.1)
SODIUM SERPL-SCNC: 138 MMOL/L (ref 136–145)
WBC # BLD AUTO: 3.4 K/UL (ref 4.8–10.8)

## 2019-07-19 RX ADMIN — IPRATROPIUM BROMIDE AND ALBUTEROL SULFATE SCH ML: .5; 3 SOLUTION RESPIRATORY (INHALATION) at 16:00

## 2019-07-19 RX ADMIN — DORZOLAMIDE HYDROCHLORIDE SCH ML: 20 SOLUTION OPHTHALMIC at 09:35

## 2019-07-19 RX ADMIN — Medication SCH MG: at 08:57

## 2019-07-19 RX ADMIN — Medication SCH DEV: at 06:59

## 2019-07-19 RX ADMIN — IPRATROPIUM BROMIDE AND ALBUTEROL SULFATE SCH ML: .5; 3 SOLUTION RESPIRATORY (INHALATION) at 22:41

## 2019-07-19 RX ADMIN — IPRATROPIUM BROMIDE AND ALBUTEROL SULFATE SCH ML: .5; 3 SOLUTION RESPIRATORY (INHALATION) at 11:25

## 2019-07-19 RX ADMIN — BUDESONIDE SCH MG: 0.25 SUSPENSION RESPIRATORY (INHALATION) at 19:02

## 2019-07-19 RX ADMIN — AMIODARONE HYDROCHLORIDE SCH MG: 200 TABLET ORAL at 20:41

## 2019-07-19 RX ADMIN — IPRATROPIUM BROMIDE AND ALBUTEROL SULFATE SCH ML: .5; 3 SOLUTION RESPIRATORY (INHALATION) at 07:13

## 2019-07-19 RX ADMIN — ATORVASTATIN CALCIUM SCH MG: 20 TABLET, FILM COATED ORAL at 08:51

## 2019-07-19 RX ADMIN — Medication SCH DEV: at 11:39

## 2019-07-19 RX ADMIN — AMIODARONE HYDROCHLORIDE SCH MG: 200 TABLET ORAL at 09:00

## 2019-07-19 RX ADMIN — INSULIN LISPRO PRN UNITS: 100 INJECTION, SOLUTION INTRAVENOUS; SUBCUTANEOUS at 16:44

## 2019-07-19 RX ADMIN — Medication SCH MG: at 08:44

## 2019-07-19 RX ADMIN — IPRATROPIUM BROMIDE AND ALBUTEROL SULFATE SCH ML: .5; 3 SOLUTION RESPIRATORY (INHALATION) at 03:31

## 2019-07-19 RX ADMIN — FUROSEMIDE SCH MG: 10 INJECTION, SOLUTION INTRAMUSCULAR; INTRAVENOUS at 09:02

## 2019-07-19 RX ADMIN — DEXTROSE SCH MLS/HR: 50 INJECTION, SOLUTION INTRAVENOUS at 20:40

## 2019-07-19 RX ADMIN — Medication SCH MG: at 20:40

## 2019-07-19 RX ADMIN — DORZOLAMIDE HYDROCHLORIDE SCH ML: 20 SOLUTION OPHTHALMIC at 20:43

## 2019-07-19 RX ADMIN — Medication SCH DEV: at 16:30

## 2019-07-19 RX ADMIN — DEXTROSE SCH MLS/HR: 50 INJECTION, SOLUTION INTRAVENOUS at 12:45

## 2019-07-19 RX ADMIN — BUDESONIDE SCH MG: 0.25 SUSPENSION RESPIRATORY (INHALATION) at 07:13

## 2019-07-19 RX ADMIN — Medication SCH DEV: at 20:41

## 2019-07-19 RX ADMIN — DEXTROSE SCH MLS/HR: 50 INJECTION, SOLUTION INTRAVENOUS at 21:04

## 2019-07-19 RX ADMIN — PANTOPRAZOLE SODIUM SCH MG: 40 INJECTION, POWDER, FOR SOLUTION INTRAVENOUS at 09:05

## 2019-07-19 RX ADMIN — DOCUSATE SODIUM SCH MG: 50 LIQUID ORAL at 08:49

## 2019-07-19 RX ADMIN — INSULIN LISPRO PRN UNITS: 100 INJECTION, SOLUTION INTRAVENOUS; SUBCUTANEOUS at 11:35

## 2019-07-19 RX ADMIN — DEXTROSE AND SODIUM CHLORIDE SCH MLS/HR: 5; .9 INJECTION, SOLUTION INTRAVENOUS at 19:36

## 2019-07-19 RX ADMIN — IPRATROPIUM BROMIDE AND ALBUTEROL SULFATE SCH ML: .5; 3 SOLUTION RESPIRATORY (INHALATION) at 19:01

## 2019-07-19 NOTE — NUR
CLEANED PATIENT, NO BM, URINE LEAKING FROM NELSON CATH, ASPIRATED 9 ML FROM NELSON CATH 
BALLOON. MAGGIE CARE PROVIDED, REPOSITIONED FOR COMFORT. ORAL CARE PROVIDED, SCANT WHITE 
SECRETIONS NOTED. NELSON CARE PROVIDED. EMPTIED 400 ML CLEAR YELLOW URINE FROM CATHETER. TUBE 
FEEDING RESIDUALS AT 10 ML. INCREASED FEEDING TO 40 ML/HR. RASS-3, V/S STABLE.
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
## 2019-07-19 NOTE — NUR
DR MCLAUGHLIN AT BEDSIDE MADE ROUNDS, UPDATE GIVEN, TOLD WILL REPEAT SEDATION VACATION STARTING 
AT 0800 AND TRY TO WEAN FROM VENT.

## 2019-07-19 NOTE — NUR
SCREEN FOR LOW SAMANTHA SCALE AT RISK, PRESSURE INJURY PREVENTION INTERVENTIONS IN PLACE.

SKIN TEARS TREATMENT DONE WITH VESATEL DRESSING IN PLACE, WILL CONTINUE TO MONITOR.

-TURN AND REPOSITION PATIENT Q 2H 

-ASSESS AND MONITOR SKIN CONDITION DURING POSITION CHANGE

-OFFLOAD BILATERAL HEELS BY PLACING PILLOWS UNDER CALVES AT ALL TIMES, UNLESS OTHERWISE 
CONTRAINDICATED

-PRESSURE REDISTRIBUTION BY PLACING PILLOWS AND OFFLOADING SACRALCOCCYX

-KEEP SKIN CLEAN AND DRY AT ALL TIMES.

## 2019-07-19 NOTE — NUR
DR. MCLAUGHLIN WAS NOTIFIED THAT PATIENT'S BP WAS STILL ELEVATED (193/95) AFTER GIVING 
HYDRALAZINE.  DR. MCLAUGHLIN REPLIED BY SAYING HE WILL ENTER A NEW ORDER FOR THE ELEVATED BP.

## 2019-07-19 NOTE — NUR
VENT CHECK DONE. VENT ALARMS ON AND AUDIBLE. AMBU BAG AT BEDSIDE. NO RESPIRATORY DISTRESS 
NOTED AT THIS TIME. WILL CONTINUE TO MONITOR.

## 2019-07-19 NOTE — NUR
* ST NOTE * 



Bedside dysphagia and oral mechanism exam attempted at this time by clinician. Pt however 
s/p extubation today, 7/19 @ 11:02 AM. As per protocol, it is recommended swallow evaluation 
be placed on hold until 24 hours s/p extubation to r/o false negative results d/t any 
potential temporary vocal fold (VF) trauma caused during extubation. Thus clinician will 
follow up for swallow evaluation during next authorized visit. 



ST to ff x1 in next 1-3 days to re-attempt Bedside Dysphagia Exam. 



PVE

## 2019-07-19 NOTE — NUR
ASSESSED PT ABILITY TO SWALLOW WITH ICE CHIPS AND ADMINISTERED CRUSHED PO MEDS WITH 
APPLESAUCE, PT TOLERATED WELL. NO INSULIN COVERAGE GIVEN AT THIS TIME. PT REPOSITIONED IN 
BED.

## 2019-07-19 NOTE — NUR
VENT CHECK DONE. VENT ALARMS ON AND AUDIBLE. SCHEDULED BREATHING TREATMENT ADMINISTERED. 
TOLERATED TX WELL, NO ADVERSE SIDE EFFECTS. NO RESPIRATORY DISTRESS NOTED AT THIS TIME. WILL 
CONTINUE TO MONITOR.

## 2019-07-19 NOTE — NUR
PT C/O THAT IT IS "HARD TO BREATHE", VSS, SATING 96% AND RR=22-24. CALLED RT JOELLEN AT BEDSIDE 
ADMINISTERING BREATHING TREATMENT AT THIS TIME. PT \SUCTIONING HIS MOUTH. CALLED DR. MORROW 
UPDATED ON PT CONDTION AND POTASSIUM CURRENTLY AT 3.4, TO UPDATE WITH NEW ORDERS.

## 2019-07-19 NOTE — NUR
RECEIVED BEDSIDE REPORT FROM MORNING SHIFT RNSUSHIL FOR CONTINUITY OF CARE. PT IS AAOX3. 
AWAKE, LETHARGIC WITH GENERALIZED WEAKNESS, BUT ABLE TO MAKE NEEDS KNOWN. ON HIGH FLOW 
OXYGEN AT 30%, LUNG SOUNDS COARSE ON UPPER/ LOWER BILATERAL LOBES. PT ABLE TO PROVIDE ORAL 
SUCTIONING TO HIMESELF, WHITE/PINK TINGED SPUTUM NOTED. SR ON CARDIAC MONITOR, VSS. BP 
143/83, RR 25, SATING 95%, HR 78, TEMP 97.8. PT NPO EXCEPT MEDS, NO NGT IN PLACE. DENIES 
PAIN/NAUSEA AT THIS TIME. ABDOMEN IS SOFT/ROUND/NONTENDER. NELSON IN PLACE, URINE IS 
CLEAR/YELLOW. PT HAS JHON PICC LINE INFUSING D5NS AT 50CC/HR, ASYMPTOMATIC, PATENT, AND 
SALINE FLUSHED WITH BLOOD RETURN OBSERVED. SKIN IS NON INTACT, SKIN TEARS ON JHON ARM COVERED 
WITH VERSATEL AND DRESSING IS DRY/INTACT. PT HAS DRY/ROUGH FLAKY SKIN ON BILATERAL FEET. HOB 
ELEVATED ABOVE 30 DEG, SIDE RAILS UP X4, STANDARD PRECAUTIONS. CALL LIGHT WITHIN REACH, LEGS 
ELEVATED WITH HEEL PROTECTORS ON.

## 2019-07-19 NOTE — NUR
RECEIVED PT ON VENT WITH SETTINGS AS CHARTED BREATH SOUNDS PRESENT BILAT CLEAR SXN PT WITH 
MIN TO  MOD AMOUNT OFF WHITE SECS EET SECURE AMBU BAG AT BEDSIDE VENT PLUGGED INTO RED 
OUTLET WILL CONTINUE TO MONITOR PT ON VENT

## 2019-07-19 NOTE — NUR
PT EXTUBATED PER DR DANIELS PLACED ON HIGH FLOW AT .34 FIO2 FLOW OF 45 PT AWAKE ALERT BREATH 
SOUNDS PRESENT BILAT SEEMS TO BE FADI WELL AT THIS POINT SPO2 .95 WILL CONTINUE TO MONITOR PT

## 2019-07-20 VITALS — SYSTOLIC BLOOD PRESSURE: 159 MMHG | DIASTOLIC BLOOD PRESSURE: 88 MMHG

## 2019-07-20 VITALS — DIASTOLIC BLOOD PRESSURE: 75 MMHG | SYSTOLIC BLOOD PRESSURE: 138 MMHG

## 2019-07-20 VITALS — DIASTOLIC BLOOD PRESSURE: 65 MMHG | SYSTOLIC BLOOD PRESSURE: 153 MMHG

## 2019-07-20 VITALS — DIASTOLIC BLOOD PRESSURE: 79 MMHG | SYSTOLIC BLOOD PRESSURE: 145 MMHG

## 2019-07-20 VITALS — SYSTOLIC BLOOD PRESSURE: 138 MMHG | DIASTOLIC BLOOD PRESSURE: 54 MMHG

## 2019-07-20 VITALS — DIASTOLIC BLOOD PRESSURE: 77 MMHG | SYSTOLIC BLOOD PRESSURE: 148 MMHG

## 2019-07-20 VITALS — SYSTOLIC BLOOD PRESSURE: 164 MMHG | DIASTOLIC BLOOD PRESSURE: 83 MMHG

## 2019-07-20 VITALS — DIASTOLIC BLOOD PRESSURE: 76 MMHG | SYSTOLIC BLOOD PRESSURE: 133 MMHG

## 2019-07-20 VITALS — SYSTOLIC BLOOD PRESSURE: 159 MMHG | DIASTOLIC BLOOD PRESSURE: 97 MMHG

## 2019-07-20 VITALS — SYSTOLIC BLOOD PRESSURE: 143 MMHG | DIASTOLIC BLOOD PRESSURE: 96 MMHG

## 2019-07-20 VITALS — DIASTOLIC BLOOD PRESSURE: 82 MMHG | SYSTOLIC BLOOD PRESSURE: 147 MMHG

## 2019-07-20 VITALS — DIASTOLIC BLOOD PRESSURE: 64 MMHG | SYSTOLIC BLOOD PRESSURE: 153 MMHG

## 2019-07-20 VITALS — SYSTOLIC BLOOD PRESSURE: 152 MMHG | DIASTOLIC BLOOD PRESSURE: 88 MMHG

## 2019-07-20 VITALS — SYSTOLIC BLOOD PRESSURE: 159 MMHG | DIASTOLIC BLOOD PRESSURE: 86 MMHG

## 2019-07-20 VITALS — SYSTOLIC BLOOD PRESSURE: 154 MMHG | DIASTOLIC BLOOD PRESSURE: 99 MMHG

## 2019-07-20 LAB
ALBUMIN FLD-MCNC: 2.2 G/DL (ref 3.4–5)
ANION GAP SERPL CALCULATED.3IONS-SCNC: 13.1 MMOL/L (ref 8–16)
BASOPHILS # BLD AUTO: 0 K/UL (ref 0–0.22)
BASOPHILS NFR BLD AUTO: 0.6 % (ref 0–2)
BUN SERPL-MCNC: 28 MG/DL (ref 7–18)
CHLORIDE SERPL-SCNC: 105 MMOL/L (ref 98–107)
CO2 SERPL-SCNC: 25.4 MMOL/L (ref 21–32)
CREAT SERPL-MCNC: 1.3 MG/DL (ref 0.7–1.3)
EOSINOPHIL # BLD AUTO: 0 K/UL (ref 0–0.4)
EOSINOPHIL NFR BLD AUTO: 0.9 % (ref 0–4)
ERYTHROCYTE [DISTWIDTH] IN BLOOD BY AUTOMATED COUNT: 14.5 % (ref 11.6–13.7)
GFR SERPL CREATININE-BSD FRML MDRD: (no result) ML/MIN (ref 90–?)
GLUCOSE SERPL-MCNC: 177 MG/DL (ref 74–106)
HCT VFR BLD AUTO: 33 % (ref 36–52)
HGB BLD-MCNC: 11 G/DL (ref 12–18)
LYMPHOCYTES # BLD AUTO: 0.6 K/UL (ref 2–11.5)
LYMPHOCYTES NFR BLD AUTO: 11.6 % (ref 20.5–51.1)
MAGNESIUM SERPL-MCNC: 1.9 MG/DL (ref 1.8–2.4)
MCH RBC QN AUTO: 30 PG (ref 27–31)
MCHC RBC AUTO-ENTMCNC: 33 G/DL (ref 33–37)
MCV RBC AUTO: 88.7 FL (ref 80–94)
MONOCYTES # BLD AUTO: 0.5 K/UL (ref 0.8–1)
MONOCYTES NFR BLD AUTO: 11.2 % (ref 1.7–9.3)
NEUTROPHILS # BLD AUTO: 3.6 K/UL (ref 1.8–7.7)
NEUTROPHILS NFR BLD AUTO: 75.7 % (ref 42.2–75.2)
PHOSPHATE SERPL-MCNC: 3 MG/DL (ref 2.5–4.9)
PLATELET # BLD AUTO: 217 K/UL (ref 140–450)
POTASSIUM SERPL-SCNC: 3.5 MMOL/L (ref 3.5–5.1)
RBC # BLD AUTO: 3.72 MIL/UL (ref 4.2–6.1)
SODIUM SERPL-SCNC: 140 MMOL/L (ref 136–145)
WBC # BLD AUTO: 4.8 K/UL (ref 4.8–10.8)

## 2019-07-20 RX ADMIN — BUDESONIDE SCH MG: 0.25 SUSPENSION RESPIRATORY (INHALATION) at 19:22

## 2019-07-20 RX ADMIN — DEXTROSE SCH MLS/HR: 50 INJECTION, SOLUTION INTRAVENOUS at 12:14

## 2019-07-20 RX ADMIN — AMIODARONE HYDROCHLORIDE SCH MG: 200 TABLET ORAL at 20:20

## 2019-07-20 RX ADMIN — Medication SCH MG: at 20:20

## 2019-07-20 RX ADMIN — AMIODARONE HYDROCHLORIDE SCH MG: 200 TABLET ORAL at 08:37

## 2019-07-20 RX ADMIN — Medication SCH DEV: at 16:56

## 2019-07-20 RX ADMIN — ACETYLCYSTEINE SCH MG: 100 INHALANT RESPIRATORY (INHALATION) at 09:00

## 2019-07-20 RX ADMIN — DEXTROSE SCH MLS/HR: 50 INJECTION, SOLUTION INTRAVENOUS at 20:20

## 2019-07-20 RX ADMIN — IPRATROPIUM BROMIDE AND ALBUTEROL SULFATE SCH ML: .5; 3 SOLUTION RESPIRATORY (INHALATION) at 15:02

## 2019-07-20 RX ADMIN — IPRATROPIUM BROMIDE AND ALBUTEROL SULFATE SCH ML: .5; 3 SOLUTION RESPIRATORY (INHALATION) at 03:24

## 2019-07-20 RX ADMIN — Medication SCH MG: at 08:37

## 2019-07-20 RX ADMIN — ATORVASTATIN CALCIUM SCH MG: 20 TABLET, FILM COATED ORAL at 08:36

## 2019-07-20 RX ADMIN — LISINOPRIL SCH MG: 10 TABLET ORAL at 08:36

## 2019-07-20 RX ADMIN — DEXTROSE SCH MLS/HR: 50 INJECTION, SOLUTION INTRAVENOUS at 04:35

## 2019-07-20 RX ADMIN — PANTOPRAZOLE SODIUM SCH MG: 40 INJECTION, POWDER, FOR SOLUTION INTRAVENOUS at 08:31

## 2019-07-20 RX ADMIN — Medication SCH MG: at 08:32

## 2019-07-20 RX ADMIN — IPRATROPIUM BROMIDE AND ALBUTEROL SULFATE SCH ML: .5; 3 SOLUTION RESPIRATORY (INHALATION) at 19:09

## 2019-07-20 RX ADMIN — Medication SCH DEV: at 07:43

## 2019-07-20 RX ADMIN — Medication SCH DEV: at 20:59

## 2019-07-20 RX ADMIN — IPRATROPIUM BROMIDE AND ALBUTEROL SULFATE SCH ML: .5; 3 SOLUTION RESPIRATORY (INHALATION) at 11:20

## 2019-07-20 RX ADMIN — IPRATROPIUM BROMIDE AND ALBUTEROL SULFATE SCH ML: .5; 3 SOLUTION RESPIRATORY (INHALATION) at 07:46

## 2019-07-20 RX ADMIN — FUROSEMIDE SCH MG: 10 INJECTION, SOLUTION INTRAMUSCULAR; INTRAVENOUS at 08:31

## 2019-07-20 RX ADMIN — DOCUSATE SODIUM SCH MG: 50 LIQUID ORAL at 08:31

## 2019-07-20 RX ADMIN — IPRATROPIUM BROMIDE AND ALBUTEROL SULFATE SCH ML: .5; 3 SOLUTION RESPIRATORY (INHALATION) at 23:15

## 2019-07-20 RX ADMIN — BUDESONIDE SCH MG: 0.25 SUSPENSION RESPIRATORY (INHALATION) at 07:46

## 2019-07-20 RX ADMIN — DORZOLAMIDE HYDROCHLORIDE SCH ML: 20 SOLUTION OPHTHALMIC at 08:37

## 2019-07-20 RX ADMIN — DEXTROSE AND SODIUM CHLORIDE SCH MLS/HR: 5; .9 INJECTION, SOLUTION INTRAVENOUS at 18:42

## 2019-07-20 RX ADMIN — DORZOLAMIDE HYDROCHLORIDE SCH ML: 20 SOLUTION OPHTHALMIC at 20:21

## 2019-07-20 RX ADMIN — Medication SCH DEV: at 12:14

## 2019-07-20 RX ADMIN — IPRATROPIUM BROMIDE AND ALBUTEROL SULFATE PRN ML: .5; 3 SOLUTION RESPIRATORY (INHALATION) at 01:15

## 2019-07-20 NOTE — NUR
CALLED PHARMACY, SPOKE TO KAVITHA WILL VERIFY ORDERS. 

RT JOELLEN AT BEDSIDE TO ADMINISTER BREATHING TREATMENT. PT AWAKE/ALERT.

## 2019-07-20 NOTE — NUR
AM CARES PROVIDED TO PT, /83 AT THIS TIME VASOTEC EFFECTIVE. HOB ELEVATED ABOVE 45 
DEG, ORAL SUCTIONIG PROVIDED INTERMITTENT COUGH WITH MINIMAL SPUTUM OBSERVED. NELSON CATH 
CARE PROVIDED AND PT REPOSITIONED.

## 2019-07-20 NOTE — NUR
PATIENT'S BP /79, ADMINISTERED HYDRALAZINE 5MG PRN IVP. PATIENT TOLERATED WELL, WILL 
CONTINUE TO MONITOR

## 2019-07-20 NOTE — NUR
RECEIVED REPORT FROM DAYSHIFT NURSE AT PATIENT'S BEDSIDE. PATIENT RESTING WELL IN BED, NO 
DISTRESS NOTED. WILL FOLLOW UP.

## 2019-07-20 NOTE — NUR
ENCOURAGED PT TO TRY AND GO SLEEP YET CONTINUE TO COUGH UP SPUTUM WHEN POSSIBLE. PT VERBALLY 
EXPRESSED THAT HE IS AFRAID THAT IF HE SLEEPS THAT HE WILL DIE. PT WAS INFORMED OF PLAN OF 
CARE AND STAFF THAT IS CURRENTLY ASSISTING HIM. PT HAD 2 ATTEMPTS FOR BOWEL MOVEMENT, YET NO 
BM AT THIS TIME. PT REPOSITIONED AND PILLOW SUPPORT PROVIDED. D5NS INFUSING AT 20CC/HR PER 
ORDER.

## 2019-07-20 NOTE — NUR
RECEIVED PATIENT SITTING UPRIGHT IN BED, AWAKE, ALERTX4. DENIES PAIN. SKIN WARM AND DRY 
VITAL SIGNS STABLE-TEMP 97.7, HR-75, BP-145/74, RR-22, O2SATS 91%. PATIENT IS ON HIGH FLOW @ 
32% O2 & 40L/MIN. LUNG SOUNDS DIMINISHED AT BASES, CLEAR IN UPPER LOBES. S1S2. BOWEL SOUNDS 
ACTIVE, ABDOMEN SOFT AND NONTENDER. PT IS NPO EXCEPT MEDS. PT HAS A PICC LINE RIGHT UPPER 
ARM, D5NS @ 20ML/HR. NELSON CATH IN PLACE, PINK TINGED URINE, YELLOW. BED LOCKED, SIDERAILS 
UP, CALL LIGHT WITHIN REACH.

## 2019-07-20 NOTE — NUR
7/20/19 

RD FOLLOW UP ASSESSMENT COMPLETED



PLEASE REFER TO NUTRITION ASSESSMENT UNDER CARE ACTIVITY FOR ESTIMATED NUTRITIONAL NEEDS. 



RD RECOMMENDATIONS: 



1. CONSIDER CCHO 60 GM DIET WITH TEXTURE RECOMMENDATIONS PER SLP. 



2. IF PT WILL NEED TUBE FEEDING, CONSIDER GLUCERNA 1.2 TO 65ML/HR, FWF:100ML Q 6HR

-THIS PROVIDES 1872KCAL, 110 GM PROTEIN AND 1660ML TOTAL FLUID DAILY. THIS MEETS 92% CALORIC 
NEEDS % PROTEIN NEEDS. 



3. RD WILL F/U 2-3 DAYS; HIGH RISK. 



MARIBELL SILVA, RD

## 2019-07-20 NOTE — NUR
RECEIVED PT ON HFNC 50LPM, 40%, SPO2 96%. PT AWAKE AND ALERT.  ADEQUATE WATER IN HFNC 
SYSTEM. HEATER SET AT 31.2 C.  PT SKIN INTACT AT THIS TIME. DECREASED FIO2 TO 33% AND FLOW 
TO 40, SPO2 95%. RN AWARE. WILL CONTINUE TO MONITOR PT.

## 2019-07-20 NOTE — NUR
CALLED RT FOR PATIENT'S COMPLAINT OF SOB, PATIENT REQUESTING MORE OXYGEN.O2 SATS-83-88%. RT 
INCREASED FLOW TO 45L/MIN. WILL CONTINUE TO MONITOR.

## 2019-07-20 NOTE — NUR
IN TO SEE PATIENT, PT COMPLAINS OF MORE SOB, PER , SWITCH TO BIPAP-10/5, IF 
PATIENT TOLERATES WELL, SWITCH BACK TO HIGH FLOW. WILL CARRY OUT

## 2019-07-20 NOTE — NUR
RECEIVED BEDSIDE REPORT FROM NIGHT SHIFT RN ANNAMARIE, FOR CONTINUITY OF CARE. PATIENT IS AAOX4, 
ABLE TO MAKE NEEDS KNOWN AND FOLLOWS COMMANDS. PATIENT'S SKIN IS WARM, DRY, AFEBRILE, HE HAS 
SKIN TEARS TO RUE. PATIENT HAS PICC LINE TO JHON, PATENT AND ASYMPTOMATIC. PATIENT IS ON HIGH 
FLOW OXYGEN AT 40%, O2 SAT IS 96, BREATHING EVEN AND UNLABORED. COARSE LUNG SOUNDS HEARD 
BILATERALLY. PATIENT IS SR ON MONITOR, BP STABLE, DENIES PAIN AT THIS TIME. PATIENT IS NPO, 
WAITING FOR SWALLOW EVAL TODAY PER NIGHT SHIFT RN. HE HAS NELSON CATHETER IN PLACE TO CLEAR 
YELLOW URINE. HOB IS 45 DEGREES, BED LOCKED IN LOW POSITION, SIDE RAILS UP. NO SIGNS OF 
DISTRESS AT THIS TIME. WILL CONTINUE TO MONITOR

## 2019-07-20 NOTE — NUR
ADMINISTERED SOLUMEDROL PER DR. STODDARD's NEW ORDER. PATIENT RESTING, NO COMPLAINTS AT THIS 
TIME. CONTINUE CLOSE MONITORING.

## 2019-07-20 NOTE — NUR
DR. ROSS AND DR. CRENSHAW AT BEDSIDE, UPDATED ON PATIENT'S CONDITION. WILL FOLLOW UP ON ANY 
ORDERS.

## 2019-07-20 NOTE — NUR
PT WAS DESATING TO 80%, RT JOELLEN AD BEDSIDE TO ADMINISTER BREATHING TREATMENT. PT CURRENTLY 
ON 40% HIGH FLOW OXYGEN. PT BP WAS INCREASED TO SBP'S , HYDRALAZINE GIVEN PER ORDER. 
DR SULTANA WAS IN TO SEE PATIENT, UPDATED ON PT CONDITIONS. PT WAKE, ABLER TO RESPOND TO 
COMMANDS, LUNG SOUNDS COARSE/CRACKLES ON BILATERAL LOWER LOBES.

## 2019-07-21 VITALS — DIASTOLIC BLOOD PRESSURE: 72 MMHG | SYSTOLIC BLOOD PRESSURE: 143 MMHG

## 2019-07-21 VITALS — SYSTOLIC BLOOD PRESSURE: 128 MMHG | DIASTOLIC BLOOD PRESSURE: 64 MMHG

## 2019-07-21 VITALS — DIASTOLIC BLOOD PRESSURE: 75 MMHG | SYSTOLIC BLOOD PRESSURE: 128 MMHG

## 2019-07-21 VITALS — DIASTOLIC BLOOD PRESSURE: 62 MMHG | SYSTOLIC BLOOD PRESSURE: 129 MMHG

## 2019-07-21 VITALS — SYSTOLIC BLOOD PRESSURE: 129 MMHG | DIASTOLIC BLOOD PRESSURE: 73 MMHG

## 2019-07-21 VITALS — SYSTOLIC BLOOD PRESSURE: 156 MMHG | DIASTOLIC BLOOD PRESSURE: 77 MMHG

## 2019-07-21 VITALS — DIASTOLIC BLOOD PRESSURE: 76 MMHG | SYSTOLIC BLOOD PRESSURE: 148 MMHG

## 2019-07-21 VITALS — SYSTOLIC BLOOD PRESSURE: 130 MMHG | DIASTOLIC BLOOD PRESSURE: 66 MMHG

## 2019-07-21 VITALS — SYSTOLIC BLOOD PRESSURE: 124 MMHG | DIASTOLIC BLOOD PRESSURE: 68 MMHG

## 2019-07-21 VITALS — DIASTOLIC BLOOD PRESSURE: 82 MMHG | SYSTOLIC BLOOD PRESSURE: 146 MMHG

## 2019-07-21 VITALS — SYSTOLIC BLOOD PRESSURE: 137 MMHG | DIASTOLIC BLOOD PRESSURE: 80 MMHG

## 2019-07-21 VITALS — DIASTOLIC BLOOD PRESSURE: 84 MMHG | SYSTOLIC BLOOD PRESSURE: 149 MMHG

## 2019-07-21 VITALS — DIASTOLIC BLOOD PRESSURE: 89 MMHG | SYSTOLIC BLOOD PRESSURE: 157 MMHG

## 2019-07-21 VITALS — SYSTOLIC BLOOD PRESSURE: 153 MMHG | DIASTOLIC BLOOD PRESSURE: 83 MMHG

## 2019-07-21 VITALS — SYSTOLIC BLOOD PRESSURE: 152 MMHG | DIASTOLIC BLOOD PRESSURE: 66 MMHG

## 2019-07-21 VITALS — DIASTOLIC BLOOD PRESSURE: 73 MMHG | SYSTOLIC BLOOD PRESSURE: 152 MMHG

## 2019-07-21 VITALS — SYSTOLIC BLOOD PRESSURE: 155 MMHG | DIASTOLIC BLOOD PRESSURE: 97 MMHG

## 2019-07-21 LAB
ANION GAP SERPL CALCULATED.3IONS-SCNC: 16.5 MMOL/L (ref 8–16)
BASOPHILS # BLD AUTO: 0 K/UL (ref 0–0.22)
BASOPHILS NFR BLD AUTO: 0.2 % (ref 0–2)
BUN SERPL-MCNC: 29 MG/DL (ref 7–18)
CHLORIDE SERPL-SCNC: 105 MMOL/L (ref 98–107)
CO2 SERPL-SCNC: 25.2 MMOL/L (ref 21–32)
CREAT SERPL-MCNC: 1.5 MG/DL (ref 0.7–1.3)
EOSINOPHIL # BLD AUTO: 0 K/UL (ref 0–0.4)
EOSINOPHIL NFR BLD AUTO: 0.1 % (ref 0–4)
ERYTHROCYTE [DISTWIDTH] IN BLOOD BY AUTOMATED COUNT: 14.6 % (ref 11.6–13.7)
GFR SERPL CREATININE-BSD FRML MDRD: (no result) ML/MIN (ref 90–?)
GLUCOSE SERPL-MCNC: 199 MG/DL (ref 74–106)
HCT VFR BLD AUTO: 32.7 % (ref 36–52)
HGB BLD-MCNC: 10.7 G/DL (ref 12–18)
LYMPHOCYTES # BLD AUTO: 0.3 K/UL (ref 2–11.5)
LYMPHOCYTES NFR BLD AUTO: 6 % (ref 20.5–51.1)
MCH RBC QN AUTO: 29 PG (ref 27–31)
MCHC RBC AUTO-ENTMCNC: 33 G/DL (ref 33–37)
MCV RBC AUTO: 89 FL (ref 80–94)
MONOCYTES # BLD AUTO: 0.1 K/UL (ref 0.8–1)
MONOCYTES NFR BLD AUTO: 2.7 % (ref 1.7–9.3)
NEUTROPHILS # BLD AUTO: 4.3 K/UL (ref 1.8–7.7)
NEUTROPHILS NFR BLD AUTO: 91 % (ref 42.2–75.2)
PLATELET # BLD AUTO: 246 K/UL (ref 140–450)
POTASSIUM SERPL-SCNC: 3.7 MMOL/L (ref 3.5–5.1)
RBC # BLD AUTO: 3.68 MIL/UL (ref 4.2–6.1)
SODIUM SERPL-SCNC: 143 MMOL/L (ref 136–145)
WBC # BLD AUTO: 4.8 K/UL (ref 4.8–10.8)

## 2019-07-21 RX ADMIN — INSULIN LISPRO PRN UNITS: 100 INJECTION, SOLUTION INTRAVENOUS; SUBCUTANEOUS at 17:12

## 2019-07-21 RX ADMIN — DOCUSATE SODIUM SCH MG: 50 LIQUID ORAL at 08:55

## 2019-07-21 RX ADMIN — WATER SCH MG: 1 INJECTION INTRAMUSCULAR; INTRAVENOUS; SUBCUTANEOUS at 20:27

## 2019-07-21 RX ADMIN — DIPHENHYDRAMINE HYDROCHLORIDE, ZINC ACETATE SCH GM: 2; .1 CREAM TOPICAL at 13:00

## 2019-07-21 RX ADMIN — IPRATROPIUM BROMIDE AND ALBUTEROL SULFATE SCH ML: .5; 3 SOLUTION RESPIRATORY (INHALATION) at 18:58

## 2019-07-21 RX ADMIN — INSULIN LISPRO PRN UNITS: 100 INJECTION, SOLUTION INTRAVENOUS; SUBCUTANEOUS at 12:48

## 2019-07-21 RX ADMIN — DORZOLAMIDE HYDROCHLORIDE SCH ML: 20 SOLUTION OPHTHALMIC at 08:18

## 2019-07-21 RX ADMIN — DEXTROSE SCH MLS/HR: 50 INJECTION, SOLUTION INTRAVENOUS at 20:25

## 2019-07-21 RX ADMIN — DEXTROSE SCH MLS/HR: 50 INJECTION, SOLUTION INTRAVENOUS at 04:39

## 2019-07-21 RX ADMIN — IPRATROPIUM BROMIDE AND ALBUTEROL SULFATE SCH ML: .5; 3 SOLUTION RESPIRATORY (INHALATION) at 11:00

## 2019-07-21 RX ADMIN — IPRATROPIUM BROMIDE AND ALBUTEROL SULFATE SCH ML: .5; 3 SOLUTION RESPIRATORY (INHALATION) at 03:20

## 2019-07-21 RX ADMIN — Medication SCH MG: at 20:56

## 2019-07-21 RX ADMIN — AMIODARONE HYDROCHLORIDE SCH MG: 200 TABLET ORAL at 08:16

## 2019-07-21 RX ADMIN — Medication SCH DEV: at 20:43

## 2019-07-21 RX ADMIN — DIPHENHYDRAMINE HYDROCHLORIDE, ZINC ACETATE SCH GM: 2; .1 CREAM TOPICAL at 16:37

## 2019-07-21 RX ADMIN — BUDESONIDE SCH MG: 0.5 SUSPENSION RESPIRATORY (INHALATION) at 07:30

## 2019-07-21 RX ADMIN — LISINOPRIL SCH MG: 10 TABLET ORAL at 08:17

## 2019-07-21 RX ADMIN — WATER SCH MG: 1 INJECTION INTRAMUSCULAR; INTRAVENOUS; SUBCUTANEOUS at 08:15

## 2019-07-21 RX ADMIN — IPRATROPIUM BROMIDE AND ALBUTEROL SULFATE SCH ML: .5; 3 SOLUTION RESPIRATORY (INHALATION) at 15:52

## 2019-07-21 RX ADMIN — IPRATROPIUM BROMIDE AND ALBUTEROL SULFATE SCH ML: .5; 3 SOLUTION RESPIRATORY (INHALATION) at 23:53

## 2019-07-21 RX ADMIN — AMIODARONE HYDROCHLORIDE SCH MG: 200 TABLET ORAL at 20:28

## 2019-07-21 RX ADMIN — DORZOLAMIDE HYDROCHLORIDE SCH ML: 20 SOLUTION OPHTHALMIC at 20:43

## 2019-07-21 RX ADMIN — ATORVASTATIN CALCIUM SCH MG: 20 TABLET, FILM COATED ORAL at 08:17

## 2019-07-21 RX ADMIN — SODIUM CHLORIDE SCH MLS/HR: 9 INJECTION, SOLUTION INTRAVENOUS at 18:42

## 2019-07-21 RX ADMIN — Medication SCH DEV: at 07:40

## 2019-07-21 RX ADMIN — ACETYLCYSTEINE SCH MG: 100 INHALANT RESPIRATORY (INHALATION) at 09:10

## 2019-07-21 RX ADMIN — IPRATROPIUM BROMIDE AND ALBUTEROL SULFATE PRN ML: .5; 3 SOLUTION RESPIRATORY (INHALATION) at 09:10

## 2019-07-21 RX ADMIN — PANTOPRAZOLE SODIUM SCH MG: 40 INJECTION, POWDER, FOR SOLUTION INTRAVENOUS at 08:16

## 2019-07-21 RX ADMIN — BUDESONIDE SCH MG: 0.5 SUSPENSION RESPIRATORY (INHALATION) at 18:58

## 2019-07-21 RX ADMIN — IPRATROPIUM BROMIDE AND ALBUTEROL SULFATE SCH ML: .5; 3 SOLUTION RESPIRATORY (INHALATION) at 07:17

## 2019-07-21 RX ADMIN — DEXTROSE SCH MLS/HR: 50 INJECTION, SOLUTION INTRAVENOUS at 12:59

## 2019-07-21 RX ADMIN — FUROSEMIDE SCH MG: 10 INJECTION, SOLUTION INTRAMUSCULAR; INTRAVENOUS at 08:16

## 2019-07-21 RX ADMIN — DEXTROSE SCH MLS/HR: 50 INJECTION, SOLUTION INTRAVENOUS at 20:26

## 2019-07-21 RX ADMIN — Medication SCH DEV: at 16:36

## 2019-07-21 RX ADMIN — INSULIN LISPRO PRN UNITS: 100 INJECTION, SOLUTION INTRAVENOUS; SUBCUTANEOUS at 21:15

## 2019-07-21 RX ADMIN — Medication SCH DEV: at 11:35

## 2019-07-21 RX ADMIN — DEXTROSE AND SODIUM CHLORIDE SCH MLS/HR: 5; .9 INJECTION, SOLUTION INTRAVENOUS at 17:43

## 2019-07-21 RX ADMIN — Medication SCH MG: at 08:17

## 2019-07-21 RX ADMIN — Medication SCH MG: at 08:18

## 2019-07-21 NOTE — NUR
PICC LINES ARE FLUSHED, PATENT, AND INTACT. LABS ARE DRAWN. PATIENT IS ALLOWING BED BATH AND 
TO CLEAN AND CHANGE HIM. PATIENT'S 4TH BOWEL MOVEMENT FOR THE SHIFT-BROWN AND SOFT. PATIENT 
REQUESTS LOTION FOR LOWER EXTREMITIES, DRY SKIN NOTED.ORAL CARE PROVIDED. PATIENT TOLERATED 
WELL. BIPAP MASK IS REAPPLIED AND SEAL IS OK BY RT SUPRIYA. WILL CONTINUE TO MONITOR.

## 2019-07-21 NOTE — NUR
IN TO ASSESS PATIENT. OK TO CONTINUE TO HOLD BLOOD THINNERS DUE TO BLOOD TRACE IN 
NELSON BAG. DR MADE AWARE THAT PATIENT HAS NOT SLEPT ALL NIGHT, WILL CARRY OUT ANY NEW 
ORDERS. PATIENT CALM IN BED BUT RESTLESS, VITAL SIGNS WITHIN NORMAL LIMITS, ANOx4.

## 2019-07-21 NOTE — NUR
DR. CRENSHAW CALLED, UPDATED ON PATIENT'S CONDITION, AWARE THAT PATIENT HAS TOLERATED BREAKFAST 
WELL AND IS ASLEEP AT THIS TIME. NO NEW ORDERS RECEIVED

## 2019-07-21 NOTE — NUR
RECEIVED REPORT FROM AM NURSE. PT AT BED AWAKE, ALERT AND ORIENTED X4, PERRLA 3MM, BRISK, 
HEART RATE REGULAR, S1S2, CAP REFILL <3S, PULSES 2+ BILATERAL UPPER AND LOWER EXTREMITIES, 
LUNG SOUNDS CLEAR THROUGHOUT, PT HAS NC RUNNING AT 3L/MIN,  ABDOMEN SOFT, ROUND, 
NONDISTENDED, NONTENDER, BOWEL SOUNDS ACTIVE IN ALL QUADRANTS, BLADDER NONDISTENDED, 
NONTENDER, FC IN PLACE, URINE YELLOW, CLEAR, SKIN NONINTACT, RIGHT UPPER ARM SKIN TEAR, 
SKIN, WARM, DRY, APPROPRIATE FOR ETHNICITY, DRESSING IN PLACE, GENERALIZED WEAKNESS, PT HAS 
RIGHT UA PICC LINE RUNNING NS AT 40 ML/HR. HOB AT 30 DEGREES, SIDERAILS UP X2, CALL LIGHT 
WITHIN REACH.

## 2019-07-21 NOTE — NUR
DR. ROSS IN TO SEE AND EXAMINE PATIENT, UPDATED ON PATIENT'S CONDITION, WILL FOLLOW UP ON ANY 
ORDER

## 2019-07-21 NOTE — NUR
PATIENT COMPLAINING THAT NO AIR IS COMING OUT OF THE MASK, RT IS MADE AWARE. RT REASSESSED 
SETTINGS, CONFIRMED OK. RT READJUSTED MASK AND CHECKED SEAL. ASKED PATIENT IF I CAN PROVIDE 
ORAL CARE WHILE MASK IS OFF, HE REFUSES ORAL CARE AND BED BATH AGAIN.

## 2019-07-21 NOTE — NUR
PATIENTS WOUNDS/SKIN TEARS ON RIGHT ARM ARE ASSESSED. DRESSINGS ARE INTACT. NO COMPLAINTS OF 
PAIN OR SOB.NO CHANGE IN LOC, PATIENT STATES THE BIPAP MASK MAKES IT HARD FOR HIM TO SLEEP. 
REPOSITIONED. PT TOLERATED WELL.

## 2019-07-21 NOTE — NUR
PATIENT STATES THAT HE FEELS RESTLESS AND WOULD LIKE SOMETHING TO HELP HIM BE CALM. SAYS 
THAT DR. CRENSHAW SPOKE WITH EARLIER AND SAID HE WOULD ORDER SOMETHING THAT WOULD HELP WITH 
THAT. GAVE PATIENT ATIVAN 1MG PRN IVP, PATIENT TOLERATED WELL.

## 2019-07-21 NOTE — NUR
RECEIVED BEDSIDE REPORT FROM NIGHT SHIFT RN JOELLEN, FOR CONTINUITY OF CARE. PATIENT IS AAOX4, 
ABLE TO MAKE NEEDS KNOWN AND FOLLOWS COMMANDS. PATIENT'S SKIN IS WARM, DRY, AFEBRILE, HE HAS 
SKIN TEARS TO RUE. PATIENT HAS PICC LINE TO JHON, PATENT AND ASYMPTOMATIC. PATIENT IS BIPAP, 
O2 SAT IS 96, BREATHING EVEN AND UNLABORED. COARSE LUNG SOUNDS HEARD BILATERALLY. PATIENT IS 
SR ON MONITOR, BP STABLE, DENIES PAIN AT THIS TIME. HE HAS NELSON CATHETER IN PLACE TO CLEAR 
YELLOW URINE. HOB IS 45 DEGREES, BED LOCKED IN LOW POSITION, SIDE RAILS UP. NO SIGNS OF 
DISTRESS AT THIS TIME. WILL CONTINUE TO MONITOR

## 2019-07-21 NOTE — NUR
REPOSITIONED PATIENT FOR COMFORT AND OFFSET PRESSURE AREAS. PATIENT DENIES PAIN AND DENIES 
SOB, PATIENT VERBALIZES THAT HIS BREATHING EFFORTS HAS IMPROVED. PATIENT RESTING WELL IN 
BED.

## 2019-07-21 NOTE — NUR
PT AT BED RESTING, EYES CLOSED, BREATHING REGULARLY, VS WNL. WILL CONTINUE TO MONITOR

-------------------------------------------------------------------------------

Addendum: 07/22/19 at 0412 by Alan Riley RN

-------------------------------------------------------------------------------

ERROR IN CHARTING

## 2019-07-21 NOTE — NUR
PATIENT DENIES PAIN, REPORTS MASK IS UNCOMFORTABLE AND IS PREVENTING PATIENT FROM SLEEPING. 
REPOSITIONED PATIENT FOR COMFORT. PATIENT REFUSES SPONGE BATH/ORAL CARE. VITAL SIGNS ARE 
STABLE, PATIENT'S O2 IS 95% AND RESPIRATIONS ARE DOWN TO 22. WILL ATTEMPT ORAL CARE/SPONGE 
BATH AT A LATER TIME.

## 2019-07-21 NOTE — NUR
PLACED PATIENT ON NASAL CANNULA AT 3LPM, PROVIDED PATIENT WITH BREAKFAST TRAY. TOLERATING 
WELL, NO SIGNS OF DISTRESS NOTED

## 2019-07-22 VITALS — SYSTOLIC BLOOD PRESSURE: 126 MMHG | DIASTOLIC BLOOD PRESSURE: 72 MMHG

## 2019-07-22 VITALS — DIASTOLIC BLOOD PRESSURE: 82 MMHG | SYSTOLIC BLOOD PRESSURE: 128 MMHG

## 2019-07-22 VITALS — SYSTOLIC BLOOD PRESSURE: 111 MMHG | DIASTOLIC BLOOD PRESSURE: 72 MMHG

## 2019-07-22 VITALS — SYSTOLIC BLOOD PRESSURE: 126 MMHG | DIASTOLIC BLOOD PRESSURE: 84 MMHG

## 2019-07-22 VITALS — DIASTOLIC BLOOD PRESSURE: 79 MMHG | SYSTOLIC BLOOD PRESSURE: 132 MMHG

## 2019-07-22 LAB
ANION GAP SERPL CALCULATED.3IONS-SCNC: 15.2 MMOL/L (ref 8–16)
BASOPHILS # BLD AUTO: 0 K/UL (ref 0–0.22)
BASOPHILS NFR BLD AUTO: 0.1 % (ref 0–2)
BUN SERPL-MCNC: 36 MG/DL (ref 7–18)
CHLORIDE SERPL-SCNC: 108 MMOL/L (ref 98–107)
CO2 SERPL-SCNC: 25.1 MMOL/L (ref 21–32)
CREAT SERPL-MCNC: 1.6 MG/DL (ref 0.7–1.3)
EOSINOPHIL # BLD AUTO: 0 K/UL (ref 0–0.4)
EOSINOPHIL NFR BLD AUTO: 0 % (ref 0–4)
ERYTHROCYTE [DISTWIDTH] IN BLOOD BY AUTOMATED COUNT: 14.7 % (ref 11.6–13.7)
GFR SERPL CREATININE-BSD FRML MDRD: (no result) ML/MIN (ref 90–?)
GLUCOSE SERPL-MCNC: 177 MG/DL (ref 74–106)
HCT VFR BLD AUTO: 31.8 % (ref 36–52)
HGB BLD-MCNC: 10.5 G/DL (ref 12–18)
LYMPHOCYTES # BLD AUTO: 0.4 K/UL (ref 2–11.5)
LYMPHOCYTES NFR BLD AUTO: 6.3 % (ref 20.5–51.1)
MAGNESIUM SERPL-MCNC: 2.1 MG/DL (ref 1.8–2.4)
MCH RBC QN AUTO: 30 PG (ref 27–31)
MCHC RBC AUTO-ENTMCNC: 33 G/DL (ref 33–37)
MCV RBC AUTO: 89.5 FL (ref 80–94)
MONOCYTES # BLD AUTO: 0.4 K/UL (ref 0.8–1)
MONOCYTES NFR BLD AUTO: 5.3 % (ref 1.7–9.3)
NEUTROPHILS # BLD AUTO: 6 K/UL (ref 1.8–7.7)
NEUTROPHILS NFR BLD AUTO: 88.3 % (ref 42.2–75.2)
PHOSPHATE SERPL-MCNC: 5 MG/DL (ref 2.5–4.9)
PLATELET # BLD AUTO: 281 K/UL (ref 140–450)
POTASSIUM SERPL-SCNC: 3.3 MMOL/L (ref 3.5–5.1)
RBC # BLD AUTO: 3.55 MIL/UL (ref 4.2–6.1)
SODIUM SERPL-SCNC: 145 MMOL/L (ref 136–145)
WBC # BLD AUTO: 6.8 K/UL (ref 4.8–10.8)

## 2019-07-22 RX ADMIN — DEXTROSE SCH MLS/HR: 50 INJECTION, SOLUTION INTRAVENOUS at 12:46

## 2019-07-22 RX ADMIN — WATER SCH MG: 1 INJECTION INTRAMUSCULAR; INTRAVENOUS; SUBCUTANEOUS at 21:20

## 2019-07-22 RX ADMIN — BUDESONIDE SCH MG: 0.5 SUSPENSION RESPIRATORY (INHALATION) at 07:57

## 2019-07-22 RX ADMIN — Medication SCH DEV: at 11:30

## 2019-07-22 RX ADMIN — WATER SCH MG: 1 INJECTION INTRAMUSCULAR; INTRAVENOUS; SUBCUTANEOUS at 09:15

## 2019-07-22 RX ADMIN — DORZOLAMIDE HYDROCHLORIDE SCH ML: 20 SOLUTION OPHTHALMIC at 09:20

## 2019-07-22 RX ADMIN — IPRATROPIUM BROMIDE AND ALBUTEROL SULFATE SCH ML: .5; 3 SOLUTION RESPIRATORY (INHALATION) at 19:24

## 2019-07-22 RX ADMIN — FUROSEMIDE SCH MG: 10 INJECTION, SOLUTION INTRAMUSCULAR; INTRAVENOUS at 09:16

## 2019-07-22 RX ADMIN — AMIODARONE HYDROCHLORIDE SCH MG: 200 TABLET ORAL at 21:24

## 2019-07-22 RX ADMIN — Medication SCH DEV: at 05:54

## 2019-07-22 RX ADMIN — IPRATROPIUM BROMIDE AND ALBUTEROL SULFATE SCH ML: .5; 3 SOLUTION RESPIRATORY (INHALATION) at 03:15

## 2019-07-22 RX ADMIN — IPRATROPIUM BROMIDE AND ALBUTEROL SULFATE SCH ML: .5; 3 SOLUTION RESPIRATORY (INHALATION) at 07:58

## 2019-07-22 RX ADMIN — DEXTROSE SCH MLS/HR: 50 INJECTION, SOLUTION INTRAVENOUS at 21:16

## 2019-07-22 RX ADMIN — LISINOPRIL SCH MG: 10 TABLET ORAL at 09:17

## 2019-07-22 RX ADMIN — ATORVASTATIN CALCIUM SCH MG: 20 TABLET, FILM COATED ORAL at 09:18

## 2019-07-22 RX ADMIN — Medication SCH MG: at 09:19

## 2019-07-22 RX ADMIN — DEXTROSE SCH MLS/HR: 50 INJECTION, SOLUTION INTRAVENOUS at 22:40

## 2019-07-22 RX ADMIN — DORZOLAMIDE HYDROCHLORIDE SCH ML: 20 SOLUTION OPHTHALMIC at 21:00

## 2019-07-22 RX ADMIN — DEXTROSE SCH MLS/HR: 50 INJECTION, SOLUTION INTRAVENOUS at 05:15

## 2019-07-22 RX ADMIN — DIPHENHYDRAMINE HYDROCHLORIDE, ZINC ACETATE SCH GM: 2; .1 CREAM TOPICAL at 16:33

## 2019-07-22 RX ADMIN — DOCUSATE SODIUM SCH MG: 50 LIQUID ORAL at 09:15

## 2019-07-22 RX ADMIN — Medication SCH MG: at 09:17

## 2019-07-22 RX ADMIN — INSULIN LISPRO PRN UNITS: 100 INJECTION, SOLUTION INTRAVENOUS; SUBCUTANEOUS at 12:48

## 2019-07-22 RX ADMIN — IPRATROPIUM BROMIDE AND ALBUTEROL SULFATE SCH ML: .5; 3 SOLUTION RESPIRATORY (INHALATION) at 22:47

## 2019-07-22 RX ADMIN — IPRATROPIUM BROMIDE AND ALBUTEROL SULFATE SCH ML: .5; 3 SOLUTION RESPIRATORY (INHALATION) at 11:51

## 2019-07-22 RX ADMIN — IPRATROPIUM BROMIDE AND ALBUTEROL SULFATE SCH ML: .5; 3 SOLUTION RESPIRATORY (INHALATION) at 15:57

## 2019-07-22 RX ADMIN — PANTOPRAZOLE SODIUM SCH MG: 40 INJECTION, POWDER, FOR SOLUTION INTRAVENOUS at 09:15

## 2019-07-22 RX ADMIN — Medication SCH MG: at 21:25

## 2019-07-22 RX ADMIN — Medication SCH DEV: at 21:00

## 2019-07-22 RX ADMIN — DIPHENHYDRAMINE HYDROCHLORIDE, ZINC ACETATE SCH GM: 2; .1 CREAM TOPICAL at 09:19

## 2019-07-22 RX ADMIN — Medication SCH DEV: at 16:32

## 2019-07-22 RX ADMIN — ACETYLCYSTEINE SCH MG: 100 INHALANT RESPIRATORY (INHALATION) at 07:59

## 2019-07-22 RX ADMIN — DIPHENHYDRAMINE HYDROCHLORIDE, ZINC ACETATE SCH GM: 2; .1 CREAM TOPICAL at 12:56

## 2019-07-22 RX ADMIN — BUDESONIDE SCH MG: 0.5 SUSPENSION RESPIRATORY (INHALATION) at 19:24

## 2019-07-22 RX ADMIN — AMIODARONE HYDROCHLORIDE SCH MG: 200 TABLET ORAL at 09:18

## 2019-07-22 NOTE — NUR
MADE ROUNDS , RESPIRATION EVEN AND UNLABORED , NO COMPLAIN MADE AT THIS TIME. CALL LIGHT 
WITHIN REACH.

## 2019-07-22 NOTE — NUR
ASSISTED PT WITH BEDPAN AND REPOSITIONED PT AT THIS TIME. PT TOLERATED WELL. BED IN LOW 
POSITION. BED ALARM ON. CALL LIGHT AT BEDSIDE. WILL CONTINUE TO MONITOR.

## 2019-07-22 NOTE — NUR
ASSISTED PT WITH BED CONTEH AT THIS TIME. NO BM AT THIS TIME. NELSON CATHETER ALREADY IN PLACE. 
PT TOLERATED WELL. RESPIRATIONS EVEN AND UNLABORED. BED IN LOW POSITION. BED ALARM ON.  CALL 
LIGHT AT BEDSIDE. WILL CONTINUE TO MONITOR.

## 2019-07-22 NOTE — NUR
GAVE ORDERED DUE MEDICATIONS AT THIS TIME. PT TOLERATED WELL. BED IN LOW POSITION. CALL 
LIGHT AT BEDSIDE. BED ALARM ON. WILL CONTINUE TO MONITOR.

## 2019-07-22 NOTE — NUR
PT LYING IN BED SLEEPING AT THIS TIME. RESPIRATIONS EVEN AND UNLABORED. BED IN LOW POSITION. 
BED ALARM ON. CALL LIGHT AT BEDSIDE. WILL CONTINUE TO MONITOR.

## 2019-07-22 NOTE — NUR
REPORT RECEIVED FROM ICU NURSE AT BEDSIDE. PT IN STABLE CONDITION. AAOX4. INTRODUCED SELF TO 
PT. BOARD UPDATED. NO COMPLAINTS OF PAIN. NO SOB ON 3L O2 VIA NC. AFEBRILE. PT IS AMBULATORY 
WITH A CANE BUT IS ON BEDREST. PT HAS A NELSON IN PLACE. PT HAS R UA PICC LINE DOUBLE LUMEN 
PATENT AND INTACT. SKIN WARM, DRY, AND NOT INTACT DUE TO R FA SKIN TEAR. BED LOCKED IN LOW 
POSITION. CALL BELL WITHIN REACH. SAFETY PRECAUTION IN PLACE. ALL NEEDS MET AT THIS TIME.

## 2019-07-22 NOTE — NUR
RECEIVED REPORT FROM NIGHT SHIFT NURSE RABIA FOR CONTINUITY OF CARE. PT IN STABLE CONDITION. 
RESPIRATIONS EVEN AND UNLABORED. O2 3LN VIA NC, INTACT AND PATENT. PICC LINE INTACT AND 
PATENT.SAFETY MEASURES IN PLACE. BED IN LOW POSITION. BED ALARM ON. CALL LIGHT AT BEDSIDE. 
WILL CONTINUE TO MONITOR.

## 2019-07-22 NOTE — NUR
* ST NOTE * 



Pt seen at bedside. Pt alert, cooperative and engaged throughout session, reporting no c/o 
pain at this time. Pt presenting with intermittent cough throughout session. Bedside 
dysphagia and oral mechanism exams completed. See evaluation report for further details. 



Pt tolerating 4/4 alternating PO trials of regular solid crackers as well as 5/5 alternating 
PO trials of thin liquid apple juice via a straw, all w/o s/s of aspiration or choking. Pt 
and nsg education completed re: aspiration precautions and safe swallow compensatory 
strategies pt and caregivers could utilize to aid pt w/swallow function, w/pt and caregivers 
verbalizing understanding and agreement w/clinician's recommendations. Because pt presenting 
with mild residue in oral cavity after PO intake of regular solids, it is recommended pt's 
PO diet consistency be modified to mechanical soft textures w/thin liquids for all meals, 
w/aspiration precautions in place. No further ST follow up recommended at this time. 



Pt and caregiver/Nsg Samantha education completed re: results of evaluation; benefits of 
abiding by aspiration precautions and recommended PO diet consistency; and prognosis for 
improvement; w/pt and caregiver/Nsg Samantha verbalizing understanding and agreement 
w/clinician's recommendations. 



Recommend: 

 - PO DIET CONSISTENCY OF MECHANICAL SOFT TEXTURES W/THIN LIQUIDS for all meals

 - WHOLE PILL MEDICATION ADMINISTRATION 

 - MAINTAIN STRICT ASPIRATION PRECAUTIONS DURING PT'S PO INTAKE 2/2 TO DX OF PNA 

 - Pt can self-feed 

 - Assure pt's dentures are in prior to PO intake 

 - CUE/REMIND PT TO SIT UP AT 80-90 DEGREE ANGLE DURING PT'S PO INTAKE; EAT/DRINK SLOWLY; 
TAKE SMALL BITES/SIPS; AND ALTERNATE BTWN SOLIDS & LIQUIDS 



No further ST follow up recommended at this time. 



NOMS Level 3

## 2019-07-22 NOTE — NUR
RECIEVED PT AAOX4 , RESPIRATION EVEN AND UNLABORED , ON O2 AT 3LPM/NC , O2 SAT 90% , V/S WNL 
. WITH PICC LINE INTACT AND PATENT , IVF INFUSING WELL , ON PUREE DIET , PLAN OF CARE 
DISCUSSED AND VERBALIZE UNDERSTANDING . BED IN LOW POSITION , SIDERAILS UPX2 , CALL LIGHT 
WITHIN REACH , WILL CONT. TO MONITOR.

## 2019-07-22 NOTE — NUR
Spoke with Shakila Brizuela CM [phone] from [facility]. Informed 
Shakila that the discharge plan for the pt per Dr. Morgan is in 2 days if everything is okay. 
Per Shakila, no transfer needed for the pt. Informed Dr. Morgan that pt doesn't need to be 
transferred to [facility] per Shakila Brizuela CM from [facility].

-------------------------------------------------------------------------------

Addendum: 07/22/19 at 1429 by Toshia Scott CM

-------------------------------------------------------------------------------

Please disregard above charting. Incorrect pt.

## 2019-07-23 VITALS — SYSTOLIC BLOOD PRESSURE: 145 MMHG | DIASTOLIC BLOOD PRESSURE: 76 MMHG

## 2019-07-23 VITALS — SYSTOLIC BLOOD PRESSURE: 133 MMHG | DIASTOLIC BLOOD PRESSURE: 70 MMHG

## 2019-07-23 VITALS — DIASTOLIC BLOOD PRESSURE: 69 MMHG | SYSTOLIC BLOOD PRESSURE: 140 MMHG

## 2019-07-23 VITALS — SYSTOLIC BLOOD PRESSURE: 142 MMHG | DIASTOLIC BLOOD PRESSURE: 81 MMHG

## 2019-07-23 VITALS — SYSTOLIC BLOOD PRESSURE: 130 MMHG | DIASTOLIC BLOOD PRESSURE: 72 MMHG

## 2019-07-23 VITALS — SYSTOLIC BLOOD PRESSURE: 146 MMHG | DIASTOLIC BLOOD PRESSURE: 78 MMHG

## 2019-07-23 LAB
ANION GAP SERPL CALCULATED.3IONS-SCNC: 13.5 MMOL/L (ref 8–16)
BASOPHILS # BLD AUTO: 0 K/UL (ref 0–0.22)
BASOPHILS NFR BLD AUTO: 0 % (ref 0–2)
BUN SERPL-MCNC: 41 MG/DL (ref 7–18)
CHLORIDE SERPL-SCNC: 106 MMOL/L (ref 98–107)
CO2 SERPL-SCNC: 25.1 MMOL/L (ref 21–32)
CREAT SERPL-MCNC: 1.8 MG/DL (ref 0.7–1.3)
EOSINOPHIL # BLD AUTO: 0 K/UL (ref 0–0.4)
EOSINOPHIL NFR BLD AUTO: 0 % (ref 0–4)
ERYTHROCYTE [DISTWIDTH] IN BLOOD BY AUTOMATED COUNT: 15.4 % (ref 11.6–13.7)
GFR SERPL CREATININE-BSD FRML MDRD: (no result) ML/MIN (ref 90–?)
GLUCOSE SERPL-MCNC: 291 MG/DL (ref 74–106)
HCT VFR BLD AUTO: 30.7 % (ref 36–52)
HGB BLD-MCNC: 9.9 G/DL (ref 12–18)
LYMPHOCYTES # BLD AUTO: 0.4 K/UL (ref 2–11.5)
LYMPHOCYTES NFR BLD AUTO: 6.4 % (ref 20.5–51.1)
MAGNESIUM SERPL-MCNC: 1.9 MG/DL (ref 1.8–2.4)
MCH RBC QN AUTO: 29 PG (ref 27–31)
MCHC RBC AUTO-ENTMCNC: 32 G/DL (ref 33–37)
MCV RBC AUTO: 90.1 FL (ref 80–94)
MONOCYTES # BLD AUTO: 0.5 K/UL (ref 0.8–1)
MONOCYTES NFR BLD AUTO: 6.9 % (ref 1.7–9.3)
NEUTROPHILS # BLD AUTO: 5.9 K/UL (ref 1.8–7.7)
NEUTROPHILS NFR BLD AUTO: 86.7 % (ref 42.2–75.2)
PHOSPHATE SERPL-MCNC: 4.4 MG/DL (ref 2.5–4.9)
PLATELET # BLD AUTO: 255 K/UL (ref 140–450)
POTASSIUM SERPL-SCNC: 3.6 MMOL/L (ref 3.5–5.1)
RBC # BLD AUTO: 3.41 MIL/UL (ref 4.2–6.1)
SODIUM SERPL-SCNC: 141 MMOL/L (ref 136–145)
WBC # BLD AUTO: 6.8 K/UL (ref 4.8–10.8)

## 2019-07-23 RX ADMIN — DIPHENHYDRAMINE HYDROCHLORIDE, ZINC ACETATE SCH GM: 2; .1 CREAM TOPICAL at 13:15

## 2019-07-23 RX ADMIN — FUROSEMIDE SCH MG: 20 TABLET ORAL at 09:32

## 2019-07-23 RX ADMIN — Medication SCH DEV: at 20:50

## 2019-07-23 RX ADMIN — DOCUSATE SODIUM SCH MG: 50 LIQUID ORAL at 09:35

## 2019-07-23 RX ADMIN — IPRATROPIUM BROMIDE AND ALBUTEROL SULFATE SCH ML: .5; 3 SOLUTION RESPIRATORY (INHALATION) at 23:00

## 2019-07-23 RX ADMIN — IPRATROPIUM BROMIDE AND ALBUTEROL SULFATE SCH ML: .5; 3 SOLUTION RESPIRATORY (INHALATION) at 11:07

## 2019-07-23 RX ADMIN — IPRATROPIUM BROMIDE AND ALBUTEROL SULFATE SCH ML: .5; 3 SOLUTION RESPIRATORY (INHALATION) at 02:58

## 2019-07-23 RX ADMIN — INSULIN LISPRO PRN UNITS: 100 INJECTION, SOLUTION INTRAVENOUS; SUBCUTANEOUS at 20:21

## 2019-07-23 RX ADMIN — PANTOPRAZOLE SODIUM SCH MG: 40 INJECTION, POWDER, FOR SOLUTION INTRAVENOUS at 09:36

## 2019-07-23 RX ADMIN — ATORVASTATIN CALCIUM SCH MG: 20 TABLET, FILM COATED ORAL at 09:34

## 2019-07-23 RX ADMIN — IPRATROPIUM BROMIDE AND ALBUTEROL SULFATE SCH ML: .5; 3 SOLUTION RESPIRATORY (INHALATION) at 19:34

## 2019-07-23 RX ADMIN — AMIODARONE HYDROCHLORIDE SCH MG: 200 TABLET ORAL at 20:18

## 2019-07-23 RX ADMIN — BUDESONIDE SCH MG: 0.5 SUSPENSION RESPIRATORY (INHALATION) at 08:15

## 2019-07-23 RX ADMIN — INSULIN LISPRO PRN UNITS: 100 INJECTION, SOLUTION INTRAVENOUS; SUBCUTANEOUS at 17:22

## 2019-07-23 RX ADMIN — INSULIN LISPRO PRN UNITS: 100 INJECTION, SOLUTION INTRAVENOUS; SUBCUTANEOUS at 06:53

## 2019-07-23 RX ADMIN — DEXTROSE SCH MLS/HR: 50 INJECTION, SOLUTION INTRAVENOUS at 20:58

## 2019-07-23 RX ADMIN — Medication SCH MG: at 09:33

## 2019-07-23 RX ADMIN — AMIODARONE HYDROCHLORIDE SCH MG: 200 TABLET ORAL at 09:34

## 2019-07-23 RX ADMIN — DIPHENHYDRAMINE HYDROCHLORIDE, ZINC ACETATE SCH GM: 2; .1 CREAM TOPICAL at 09:46

## 2019-07-23 RX ADMIN — Medication SCH MG: at 09:32

## 2019-07-23 RX ADMIN — BUDESONIDE SCH MG: 0.5 SUSPENSION RESPIRATORY (INHALATION) at 19:35

## 2019-07-23 RX ADMIN — Medication SCH MG: at 20:18

## 2019-07-23 RX ADMIN — ACETYLCYSTEINE SCH MG: 100 INHALANT RESPIRATORY (INHALATION) at 08:16

## 2019-07-23 RX ADMIN — DEXTROSE SCH MLS/HR: 50 INJECTION, SOLUTION INTRAVENOUS at 05:18

## 2019-07-23 RX ADMIN — Medication SCH DEV: at 12:03

## 2019-07-23 RX ADMIN — Medication SCH DEV: at 06:33

## 2019-07-23 RX ADMIN — DORZOLAMIDE HYDROCHLORIDE SCH ML: 20 SOLUTION OPHTHALMIC at 20:27

## 2019-07-23 RX ADMIN — DIPHENHYDRAMINE HYDROCHLORIDE, ZINC ACETATE SCH GM: 2; .1 CREAM TOPICAL at 17:20

## 2019-07-23 RX ADMIN — DEXTROSE SCH MLS/HR: 50 INJECTION, SOLUTION INTRAVENOUS at 20:21

## 2019-07-23 RX ADMIN — IPRATROPIUM BROMIDE AND ALBUTEROL SULFATE SCH ML: .5; 3 SOLUTION RESPIRATORY (INHALATION) at 08:15

## 2019-07-23 RX ADMIN — DORZOLAMIDE HYDROCHLORIDE SCH ML: 20 SOLUTION OPHTHALMIC at 09:44

## 2019-07-23 RX ADMIN — Medication SCH DEV: at 17:20

## 2019-07-23 RX ADMIN — SODIUM CHLORIDE SCH MLS/HR: 9 INJECTION, SOLUTION INTRAVENOUS at 02:33

## 2019-07-23 RX ADMIN — IPRATROPIUM BROMIDE AND ALBUTEROL SULFATE SCH ML: .5; 3 SOLUTION RESPIRATORY (INHALATION) at 13:54

## 2019-07-23 RX ADMIN — DEXTROSE SCH MLS/HR: 50 INJECTION, SOLUTION INTRAVENOUS at 13:15

## 2019-07-23 RX ADMIN — INSULIN LISPRO PRN UNITS: 100 INJECTION, SOLUTION INTRAVENOUS; SUBCUTANEOUS at 12:07

## 2019-07-23 NOTE — NUR
PATIENT IS RESTING ON BED. DENIED PAIN AND SOB. RESPIRATION EVEN AND UNLABORED ON 3LPM VIA 
NC. NO SIGNS OF DISTRESS NOTED. TELE MONITOR ATTACHED. SAFETY MEASURES IN PLACE. BED IN LOW 
POSITION AND CALL LIGHT WITHIN REACH. BED ALARM ACTIVATED. INSTRUCTED PATIENT TO USE THE 
CALL LIGHT FOR ANY ASSISTANCE AND PATIENT WAS AWARE.

## 2019-07-23 NOTE — NUR
MADE ROUNDS , RESPIRATION EVEN AND UNLABORED , WITH NO COMPLAIN MADE ATTHIS TIME. CALL LIGHT 
WITHIN REACH

## 2019-07-23 NOTE — NUR
ADMINISTERED MEDS AS PER MD ORDER, PATIENT TOLERATED WELL. DENIED PAIN AND SOB. PATIENT IS 
ON 3LPM VIA NC. RESPIRATION EVEN AND UNLABORED. NO SIGNS OF DISTRESS NOTED. TELE MONITOR 
ATTACHED. SAFETY MEASURES IN PLACE. BED IN LOW POSITION AND CALL LIGHT WITHIN REACH. 
INSTRUCTED PATIENT TO USE THE CALL LIGHT FOR ANY ASSISTANCE AND PATIENT WAS AWARE.

## 2019-07-23 NOTE — NUR
7/23/19 RD FOLLOW UP COMPLETED



PLEASE REFER TO NUTRITION ASSESSMENT UNDER CARE ACTIVITY FOR ESTIMATED NUTRITIONAL NEEDS. 



1. RECOMMEND MECHANICAL SOFT DIET CCHO 60 GM AND CARDIAC DIET DIET AS TOLERATED 

2. RD TO FOLLOW-UP 2-3 DAYS, HIGH RISK 



BETTIE ROTH, RD

## 2019-07-23 NOTE — NUR
PATIENT AWAKE AND WATCHING TV ON BED. RESPIRATION EVEN AND UNLABORED ON 3LPM VIA NC. DENIED 
PAIN AND SOB. NELSON DRAINING YELLOW URINE. NO SIGNS OF DISTRESS NOTED. TELE MONITOR 
ATTACHED. SAFETY MEASURES IN PLACE. BED IN LOW POSITION AND CALL LIGHT WITHIN REACH. BED 
ALARM ACTIVATED. INSTRUCTED PATIENT TO USE THE CALL LIGHT FOR ANY ASSISTANCE AND PATIENT WAS 
AWARE.

## 2019-07-23 NOTE — NUR
ENDORSED PATIENT AT BEDSIDE TO NIGHT SHIFT NURSE FOR CONTINUITY OF CARE. PATIENT AWAKE AND 
WATCHING TV ON BED. NO SIGNS OF DISTRESS NOTED. PATIENT IS IN STABLE CONDITION.  TELE 
MONITOR ATTACHED. SAFETY MEASURES IN PLACE. BED IN LOW POSITION AND CALL LIGHT WITHIN REACH.

## 2019-07-23 NOTE — NUR
RECEIVED PT ON BED, AAOX4, VITAL SIGNS STABLE, DENIES ANY PAIN, NO SOB NOTED, SAT-94% ON O2 
AT 3L NC, OCCASIONAL COUGH NOTED, IVF INFUSING WELL VIA RT UA PICC LINE, DRESSING DRY AND 
INTACT, NELSON CATHETER IN PLACE DRAINING WELL, PLAN OF CARE DISCUSSED, SAFETY MEASURES IN 
PLACE, CALL LIGHT WITHIN REACH.

## 2019-07-23 NOTE — NUR
PATIENT AWAKE AND WATCHING TV ON BED. RESPIRATION EVEN AND UNLABORED ON 3LPM VIA NC. PATIENT 
DENIED PAIN AND SOB. NO SIGNS OF DISTRESS NOTED. SAFETY MEASURES IN PLACE. BED IN LOW 
POSITION AND CALL LIGHT WITHIN REACH. BED ALARM ACTIVATED. INSTRUCTED PATIENT TO USE THE 
CALL LIGHT FOR ANY ASSISTANCE AND PATIENT WAS AWARE.

## 2019-07-23 NOTE — NUR
PT AWAKE WATCHING TV, VITAL SIGNS STABLE, NO SOB NOTED, DENIES ANY PAIN, CONTINUE TO MONITOR 
CLOSELY.

## 2019-07-23 NOTE — NUR
RECEIVED BEDSIDE REPORT FROM NIGHT SHIFT NURSE FOR CONTINUITY OF CARE. PATIENT IS AWAKE AND 
RESTING ON BED. PATIENT IS AAOX4, SPEAKS Vincentian. RESPIRATION EVEN AND UNLABORED ON 3LPM VIA 
NC. LUNGS ARE CLEAR ON AUSCULTATION. PATIENT DENIED PAIN AT THIS TIME. NO SIGNS OF DISTRESS 
NOTED. PICC ON R UPPER ARM, CLEAN AND INTACT, INFUSING AS PER MD ORDER. R ARM SKIN TEAR 
NOTED, COVERED WITH DRESSING, CLEAN AND DRY. NELSON IN PLACE AND DRAINING YELLOW URINE. 
PATIENT IS BEDREST. DISCUSSED PLAN OF CARE WITH PATIENT, AND PATIENT VERBALIZED 
UNDERSTANDING. TELE MONITOR ATTACHED. SAFETY MEASURES IN PLACE. FALL PROTOCOL INITIALED. BED 
IN LOW POSITION AND CALL LIGHT WITHIN REACH. INSTRUCTED PATIENT TO USE THE CALL LIGHT FOR 
ANY ASSISTANCE AND PATIENT WAS AWARE.

## 2019-07-23 NOTE — NUR
BLOOD SUGAR CHECKED WITH 187 RESULT, COVERAGE GIVEN, DUE MEDS ADMINISTERED, TOLERATED WELL, 
ALL NEEDS ATTENDED.

## 2019-07-23 NOTE — NUR
MADE ROUNDS , V/S WNL , RESPIRATION EVEN AND UNLABORED , WATCHING TV , NO COMPLAIN MADE AT 
THIS TIME.CALL LIGHT WITHIN REACH.

## 2019-07-24 VITALS — DIASTOLIC BLOOD PRESSURE: 72 MMHG | SYSTOLIC BLOOD PRESSURE: 148 MMHG

## 2019-07-24 VITALS — SYSTOLIC BLOOD PRESSURE: 131 MMHG | DIASTOLIC BLOOD PRESSURE: 72 MMHG

## 2019-07-24 VITALS — SYSTOLIC BLOOD PRESSURE: 154 MMHG | DIASTOLIC BLOOD PRESSURE: 94 MMHG

## 2019-07-24 VITALS — SYSTOLIC BLOOD PRESSURE: 145 MMHG | DIASTOLIC BLOOD PRESSURE: 76 MMHG

## 2019-07-24 VITALS — SYSTOLIC BLOOD PRESSURE: 147 MMHG | DIASTOLIC BLOOD PRESSURE: 81 MMHG

## 2019-07-24 VITALS — DIASTOLIC BLOOD PRESSURE: 80 MMHG | SYSTOLIC BLOOD PRESSURE: 155 MMHG

## 2019-07-24 LAB
ANION GAP SERPL CALCULATED.3IONS-SCNC: 12.7 MMOL/L (ref 8–16)
BASOPHILS # BLD AUTO: 0 K/UL (ref 0–0.22)
BASOPHILS NFR BLD AUTO: 0.2 % (ref 0–2)
BUN SERPL-MCNC: 40 MG/DL (ref 7–18)
CHLORIDE SERPL-SCNC: 108 MMOL/L (ref 98–107)
CO2 SERPL-SCNC: 23.7 MMOL/L (ref 21–32)
CREAT SERPL-MCNC: 1.8 MG/DL (ref 0.7–1.3)
EOSINOPHIL # BLD AUTO: 0 K/UL (ref 0–0.4)
EOSINOPHIL NFR BLD AUTO: 0.1 % (ref 0–4)
ERYTHROCYTE [DISTWIDTH] IN BLOOD BY AUTOMATED COUNT: 15.4 % (ref 11.6–13.7)
GFR SERPL CREATININE-BSD FRML MDRD: (no result) ML/MIN (ref 90–?)
GLUCOSE SERPL-MCNC: 153 MG/DL (ref 74–106)
HCT VFR BLD AUTO: 30.9 % (ref 36–52)
HGB BLD-MCNC: 10 G/DL (ref 12–18)
LYMPHOCYTES # BLD AUTO: 1.1 K/UL (ref 2–11.5)
LYMPHOCYTES NFR BLD AUTO: 17.7 % (ref 20.5–51.1)
MCH RBC QN AUTO: 29 PG (ref 27–31)
MCHC RBC AUTO-ENTMCNC: 32 G/DL (ref 33–37)
MCV RBC AUTO: 90 FL (ref 80–94)
MONOCYTES # BLD AUTO: 0.6 K/UL (ref 0.8–1)
MONOCYTES NFR BLD AUTO: 9.9 % (ref 1.7–9.3)
NEUTROPHILS # BLD AUTO: 4.4 K/UL (ref 1.8–7.7)
NEUTROPHILS NFR BLD AUTO: 72.1 % (ref 42.2–75.2)
PLATELET # BLD AUTO: 251 K/UL (ref 140–450)
POTASSIUM SERPL-SCNC: 3.4 MMOL/L (ref 3.5–5.1)
RBC # BLD AUTO: 3.44 MIL/UL (ref 4.2–6.1)
SODIUM SERPL-SCNC: 141 MMOL/L (ref 136–145)
WBC # BLD AUTO: 6.1 K/UL (ref 4.8–10.8)

## 2019-07-24 RX ADMIN — ATORVASTATIN CALCIUM SCH MG: 20 TABLET, FILM COATED ORAL at 08:25

## 2019-07-24 RX ADMIN — AMIODARONE HYDROCHLORIDE SCH MG: 200 TABLET ORAL at 08:26

## 2019-07-24 RX ADMIN — IPRATROPIUM BROMIDE AND ALBUTEROL SULFATE SCH ML: .5; 3 SOLUTION RESPIRATORY (INHALATION) at 03:00

## 2019-07-24 RX ADMIN — DOCUSATE SODIUM SCH MG: 50 LIQUID ORAL at 08:25

## 2019-07-24 RX ADMIN — DIPHENHYDRAMINE HYDROCHLORIDE, ZINC ACETATE SCH GM: 2; .1 CREAM TOPICAL at 12:26

## 2019-07-24 RX ADMIN — IPRATROPIUM BROMIDE AND ALBUTEROL SULFATE SCH ML: .5; 3 SOLUTION RESPIRATORY (INHALATION) at 19:51

## 2019-07-24 RX ADMIN — DIPHENHYDRAMINE HYDROCHLORIDE, ZINC ACETATE SCH GM: 2; .1 CREAM TOPICAL at 08:30

## 2019-07-24 RX ADMIN — IPRATROPIUM BROMIDE AND ALBUTEROL SULFATE SCH ML: .5; 3 SOLUTION RESPIRATORY (INHALATION) at 15:43

## 2019-07-24 RX ADMIN — DEXTROSE SCH MLS/HR: 50 INJECTION, SOLUTION INTRAVENOUS at 21:27

## 2019-07-24 RX ADMIN — IPRATROPIUM BROMIDE AND ALBUTEROL SULFATE SCH ML: .5; 3 SOLUTION RESPIRATORY (INHALATION) at 22:47

## 2019-07-24 RX ADMIN — DIPHENHYDRAMINE HYDROCHLORIDE, ZINC ACETATE SCH GM: 2; .1 CREAM TOPICAL at 16:52

## 2019-07-24 RX ADMIN — DEXTROSE SCH MLS/HR: 50 INJECTION, SOLUTION INTRAVENOUS at 04:56

## 2019-07-24 RX ADMIN — INSULIN LISPRO PRN UNITS: 100 INJECTION, SOLUTION INTRAVENOUS; SUBCUTANEOUS at 17:39

## 2019-07-24 RX ADMIN — IPRATROPIUM BROMIDE AND ALBUTEROL SULFATE SCH ML: .5; 3 SOLUTION RESPIRATORY (INHALATION) at 07:15

## 2019-07-24 RX ADMIN — IPRATROPIUM BROMIDE AND ALBUTEROL SULFATE SCH ML: .5; 3 SOLUTION RESPIRATORY (INHALATION) at 11:35

## 2019-07-24 RX ADMIN — INSULIN LISPRO PRN UNITS: 100 INJECTION, SOLUTION INTRAVENOUS; SUBCUTANEOUS at 21:34

## 2019-07-24 RX ADMIN — BUDESONIDE SCH MG: 0.5 SUSPENSION RESPIRATORY (INHALATION) at 07:20

## 2019-07-24 RX ADMIN — Medication SCH MG: at 08:27

## 2019-07-24 RX ADMIN — DEXTROSE SCH MLS/HR: 50 INJECTION, SOLUTION INTRAVENOUS at 21:58

## 2019-07-24 RX ADMIN — Medication SCH DEV: at 06:36

## 2019-07-24 RX ADMIN — DORZOLAMIDE HYDROCHLORIDE SCH DROP: 20 SOLUTION OPHTHALMIC at 21:33

## 2019-07-24 RX ADMIN — BUDESONIDE SCH MG: 0.5 SUSPENSION RESPIRATORY (INHALATION) at 19:51

## 2019-07-24 RX ADMIN — DEXTROSE SCH MLS/HR: 50 INJECTION, SOLUTION INTRAVENOUS at 12:25

## 2019-07-24 RX ADMIN — PANTOPRAZOLE SODIUM SCH MG: 40 INJECTION, POWDER, FOR SOLUTION INTRAVENOUS at 08:28

## 2019-07-24 RX ADMIN — Medication SCH MG: at 08:26

## 2019-07-24 RX ADMIN — INSULIN LISPRO PRN UNITS: 100 INJECTION, SOLUTION INTRAVENOUS; SUBCUTANEOUS at 12:24

## 2019-07-24 RX ADMIN — FUROSEMIDE SCH MG: 20 TABLET ORAL at 08:27

## 2019-07-24 RX ADMIN — Medication SCH DEV: at 21:35

## 2019-07-24 RX ADMIN — Medication SCH DEV: at 11:55

## 2019-07-24 RX ADMIN — DORZOLAMIDE HYDROCHLORIDE SCH DROP: 20 SOLUTION OPHTHALMIC at 08:23

## 2019-07-24 RX ADMIN — Medication SCH MG: at 08:58

## 2019-07-24 RX ADMIN — Medication SCH DEV: at 16:44

## 2019-07-24 RX ADMIN — Medication SCH MG: at 21:27

## 2019-07-24 RX ADMIN — ACETYLCYSTEINE SCH MG: 100 INHALANT RESPIRATORY (INHALATION) at 08:29

## 2019-07-24 NOTE — NUR
PT SLEEPING EASILY AROUSABLE, NO SIGNS OF DISTRESS, REPORT GIVEN TO NAZIA DIEGO FOR 
CONTINUITY OF CARE.

## 2019-07-24 NOTE — NUR
attempted to wean pt dowm on fi02 fr0m 3lpm pt desaturated to .85 sp02 placed back on 3lpm 
nc dr dillon aware

## 2019-07-24 NOTE — NUR
PT GIVEN BEDPAN PER REQUEST. NO SIGNS OF DISTRESS OR SOB. RESPIRATIONS EVEN AND UNLABORE ON 
O2 3L VIA N/C.

## 2019-07-24 NOTE — NUR
DISCONTINUED F/C PER ORDER, NOTED 350 ML OF YELLOW AND CLEAR URINE. PT TOLERATED WELL. N C/O 
PAIN AT THIS TIME. PT GIVEN URINAL TO USE FOR LATER TIME.

## 2019-07-24 NOTE — NUR
SPOKE WITH RT REGARDING MUCOMYST BREATHING TX. PT GIVEN TX PER ORDER. GIVEN TO RIGHT 
PATIENT, RIGHT ROUTE, RIGHT DOSE, RIGHT MEDICATION, RIGHT TIME. PT IS ON O2 3L VIA N/C, O2 
SAT 99%. PT TOLERATED WELL, NO SIGNS OF DISTRESS AT THIS TIME.

## 2019-07-24 NOTE — NUR
PT SLEEPING, EASILY AROUSABLE, VITAL SIGNS STABLE, DENIES ANY PAIN, NO SOB NOTED, PT WENT 
BACK TO SLEEP, MONITORED CLOSELY.

## 2019-07-24 NOTE — NUR
ANÍBAL FROM [facility] [phone] MADE AWARE OF DC PLAN FOR TOMORROW.

## 2019-07-24 NOTE — NUR
RECEIVED BEDSIDE REPORT FROM DAY SHIFT RN JOHNATHON, PT STABLE, NO DISTRESS NOTED, PICC LINE 
TO R UPPER ARM DOUBLE LUMEN, ONE LUMEN PATENT, DRESSING CLEAN DRY AND INTACT, PT ON 3LPM O2 
VIA NC, NO SOB NOTED, NO C/O PAIN AT THIS MOMENT, INITIAL ASSESSMENT DONE, ALL SAFETY 
PRECAUTION MET, CALL LIGHT WITHIN REACH, WILL CONTINUE TO MONITOR.

## 2019-07-24 NOTE — NUR
RECEIVED REPORT FROM NIGHT SHIFT NURSE. AROUSABLE TO NAME, PT AAOX4, NO C/O PAIN. 
RESPIRATIONS EVEN AND UNLABORED ON O2 3L VIA N/C, CLEAR LUNG SOUNDS, PT HAVING BREATHING 
TREATMENTS AT THE MOMENT. BOWEL SOUNDS ACTIVE, REPORTED LBM 7/24. NO S/S OF 
HYPO/HYPERGLYCEMIA. PT ON CARDIAC MONITOR. NO EDEMA TO EXTREMITIES NOTED. F/C IN PLACE, 15 
ML OF YELLOW AND CLEAR URINE NOTED IN COLLECTION BAG. SKIN COLOR IS APPROPRIATE TO 
ETHNICITY, INTEGRITY IS INTACT, WARM TO TOUCH. REVIEWED POC WITH PT, PT VERBALIZED 
UNDERSTANDING. PT ON FALL PRECAUTIONS, NOTED YELLOW GOWN, YELLOW SOCKS, YELLOW ARMBAND AND 
FALLING STAR POSTED ON DOOR. SAFETY MEASURES IN PLACE, BED ON LOW POSITION, CALL LIGHT 
WITHIN REACH. WILL CONTINUE TO MONITOR.

-------------------------------------------------------------------------------

Addendum: 07/24/19 at 1008 by Jaclyn Long RN

-------------------------------------------------------------------------------

ADDITION:

NOTED SKIN TEAR TO LEFT UPPER ARM, DRESSING CHANGED. NO NEW SKIN TEAR NOTED.

-------------------------------------------------------------------------------

Addendum: 07/24/19 at 1021 by Jaclyn Long RN

-------------------------------------------------------------------------------

CLARIFICATION:

SKIN TEAR TO RIGHT UPPER ARM.

-------------------------------------------------------------------------------

Addendum: 07/24/19 at 1259 by Jaclyn Long RN

-------------------------------------------------------------------------------

ADDITION

PICC LINE ON RT UA. ONE PORT RUNNING IVF PER ORDER. RESISTANCE WHEN FLUSHING TO OTHER PORT, 
DISINFECTING CAP PLACED.

## 2019-07-25 VITALS — DIASTOLIC BLOOD PRESSURE: 69 MMHG | SYSTOLIC BLOOD PRESSURE: 139 MMHG

## 2019-07-25 VITALS — DIASTOLIC BLOOD PRESSURE: 83 MMHG | SYSTOLIC BLOOD PRESSURE: 129 MMHG

## 2019-07-25 VITALS — SYSTOLIC BLOOD PRESSURE: 147 MMHG | DIASTOLIC BLOOD PRESSURE: 69 MMHG

## 2019-07-25 VITALS — SYSTOLIC BLOOD PRESSURE: 155 MMHG | DIASTOLIC BLOOD PRESSURE: 81 MMHG

## 2019-07-25 LAB
ANION GAP SERPL CALCULATED.3IONS-SCNC: 12.5 MMOL/L (ref 8–16)
BASOPHILS # BLD AUTO: 0 K/UL (ref 0–0.22)
BASOPHILS NFR BLD AUTO: 0.2 % (ref 0–2)
BUN SERPL-MCNC: 35 MG/DL (ref 7–18)
CHLORIDE SERPL-SCNC: 109 MMOL/L (ref 98–107)
CO2 SERPL-SCNC: 25.7 MMOL/L (ref 21–32)
CREAT SERPL-MCNC: 1.7 MG/DL (ref 0.7–1.3)
EOSINOPHIL # BLD AUTO: 0 K/UL (ref 0–0.4)
EOSINOPHIL NFR BLD AUTO: 0.1 % (ref 0–4)
ERYTHROCYTE [DISTWIDTH] IN BLOOD BY AUTOMATED COUNT: 15.2 % (ref 11.6–13.7)
GFR SERPL CREATININE-BSD FRML MDRD: (no result) ML/MIN (ref 90–?)
GLUCOSE SERPL-MCNC: 107 MG/DL (ref 74–106)
HCT VFR BLD AUTO: 32.3 % (ref 36–52)
HGB BLD-MCNC: 10.7 G/DL (ref 12–18)
LYMPHOCYTES # BLD AUTO: 1.1 K/UL (ref 2–11.5)
LYMPHOCYTES NFR BLD AUTO: 20.9 % (ref 20.5–51.1)
MAGNESIUM SERPL-MCNC: 1.9 MG/DL (ref 1.8–2.4)
MCH RBC QN AUTO: 30 PG (ref 27–31)
MCHC RBC AUTO-ENTMCNC: 33 G/DL (ref 33–37)
MCV RBC AUTO: 89.7 FL (ref 80–94)
MONOCYTES # BLD AUTO: 0.5 K/UL (ref 0.8–1)
MONOCYTES NFR BLD AUTO: 8.8 % (ref 1.7–9.3)
NEUTROPHILS # BLD AUTO: 3.6 K/UL (ref 1.8–7.7)
NEUTROPHILS NFR BLD AUTO: 70 % (ref 42.2–75.2)
PHOSPHATE SERPL-MCNC: 3.7 MG/DL (ref 2.5–4.9)
PLATELET # BLD AUTO: 253 K/UL (ref 140–450)
POTASSIUM SERPL-SCNC: 3.2 MMOL/L (ref 3.5–5.1)
RBC # BLD AUTO: 3.6 MIL/UL (ref 4.2–6.1)
SODIUM SERPL-SCNC: 144 MMOL/L (ref 136–145)
WBC # BLD AUTO: 5.2 K/UL (ref 4.8–10.8)

## 2019-07-25 PROCEDURE — 3E0234Z INTRODUCTION OF SERUM, TOXOID AND VACCINE INTO MUSCLE, PERCUTANEOUS APPROACH: ICD-10-PCS | Performed by: STUDENT IN AN ORGANIZED HEALTH CARE EDUCATION/TRAINING PROGRAM

## 2019-07-25 RX ADMIN — PANTOPRAZOLE SODIUM SCH MG: 40 INJECTION, POWDER, FOR SOLUTION INTRAVENOUS at 08:47

## 2019-07-25 RX ADMIN — Medication SCH MG: at 08:45

## 2019-07-25 RX ADMIN — DORZOLAMIDE HYDROCHLORIDE SCH DROP: 20 SOLUTION OPHTHALMIC at 08:50

## 2019-07-25 RX ADMIN — Medication SCH MG: at 08:43

## 2019-07-25 RX ADMIN — IPRATROPIUM BROMIDE AND ALBUTEROL SULFATE SCH ML: .5; 3 SOLUTION RESPIRATORY (INHALATION) at 15:27

## 2019-07-25 RX ADMIN — INSULIN LISPRO PRN UNITS: 100 INJECTION, SOLUTION INTRAVENOUS; SUBCUTANEOUS at 12:22

## 2019-07-25 RX ADMIN — DIPHENHYDRAMINE HYDROCHLORIDE, ZINC ACETATE SCH GM: 2; .1 CREAM TOPICAL at 13:14

## 2019-07-25 RX ADMIN — ATORVASTATIN CALCIUM SCH MG: 20 TABLET, FILM COATED ORAL at 08:44

## 2019-07-25 RX ADMIN — DIPHENHYDRAMINE HYDROCHLORIDE, ZINC ACETATE SCH GM: 2; .1 CREAM TOPICAL at 08:49

## 2019-07-25 RX ADMIN — Medication SCH DEV: at 11:56

## 2019-07-25 RX ADMIN — IPRATROPIUM BROMIDE AND ALBUTEROL SULFATE SCH ML: .5; 3 SOLUTION RESPIRATORY (INHALATION) at 07:18

## 2019-07-25 RX ADMIN — IPRATROPIUM BROMIDE AND ALBUTEROL SULFATE SCH ML: .5; 3 SOLUTION RESPIRATORY (INHALATION) at 11:02

## 2019-07-25 RX ADMIN — ACETYLCYSTEINE SCH MG: 100 INHALANT RESPIRATORY (INHALATION) at 07:18

## 2019-07-25 RX ADMIN — IPRATROPIUM BROMIDE AND ALBUTEROL SULFATE SCH ML: .5; 3 SOLUTION RESPIRATORY (INHALATION) at 03:26

## 2019-07-25 RX ADMIN — BUDESONIDE SCH MG: 0.5 SUSPENSION RESPIRATORY (INHALATION) at 07:34

## 2019-07-25 RX ADMIN — Medication SCH DEV: at 06:12

## 2019-07-25 RX ADMIN — DOCUSATE SODIUM SCH MG: 50 LIQUID ORAL at 08:44

## 2019-07-25 RX ADMIN — AMIODARONE HYDROCHLORIDE SCH MG: 200 TABLET ORAL at 08:45

## 2019-07-25 NOTE — NUR
PT GIVEN 4 UNITS OF HUMALOG PER SLIDING SCALE. NO SIGNS OF HYPERGLYCEMIA. WILL CONTINUE TO 
MONITOR.

## 2019-07-25 NOTE — NUR
CONTACTED [facility] -[id number], PER ANÍBAL PATIENT WILL GO TO ROOM 9B UNDER DR. BAILEE ROSS.  
WILL SET UP TRANSPORT.

## 2019-07-25 NOTE — NUR
TRANSPORTATION IS SET UP WITH [facility], PER DOMINIC  WILL AT 1630.  PRIMARY RN 
JOHNATHON MADE AWARE.

## 2019-07-25 NOTE — NUR
PT HAS BEEN DISCHARGED FOR [place]SF[place] [facility]. ALL PAPERWORK SIGNED, ALL QUESTIONS ANSWERED, 
ALL BELONGINGS IN PT POSSESSION. DISCONTINUED PICC LINE USING ASEPTIC TECHNIQUE, CANNULA 
INTACT, NO ACTIVE BLEEDING NOTED, PRESSURE APPLIED TO AREA. WRISTBANDS AND TELE MONITOR 
REMOVED. PT TRANSFERRED OUT OF UNIT VIA GURNEY WITH [facility]. PT IS IN STABLE 
CONDITION.

## 2019-07-25 NOTE — NUR
RECEIVED REPORT FROM NIGHT SHIFT NURSE. PT SLEEPING SUPINE IN BED, AROUSABLE TO NAME, PT 
AAOX4, NO C/O PAIN AT THIS TIME. RESPIRATIONS EVEN AND UNLABORED ON O2 3L VIA N/C, CLEAR 
LUNG SOUNDS. BOWEL SOUNDS ACTIVE, ABD SOFT. NO S/S OF HYPO/HYPERGLYCEMIA. PT ON CARDIAC 
MONITOR. PICC LINE ON RT UA, ONLY 1 LUMEN IS PATENT. NO EDEMA TO EXTREMITIES NOTED. SKIN 
COLOR IS APPROPRIATE TO ETHNICITY, WARM TO TOUCH, SKIN TEAR TO RIGHT UPPER ARM WITH DRESSING 
CLEAN, DRY AND INTACT. REVIEWED POC WITH PT, PT VERBALIZED UNDERSTANDING. PT ON FALL 
PRECAUTIONS, NOTED YELLOW GOWN, YELLOW SOCKS, YELLOW ARMBAND AND FALLING STAR POSTED ON 
DOOR. SAFETY MEASURES IN PLACE, BED ON LOW POSITION, CALL LIGHT WITHIN REACH. WILL CONTINUE 
TO MONITOR.

## 2019-07-25 NOTE — NUR
CALLED [facility] AND GAVE REPORT TO NAZIA CAMPBELL REGARDING PT CONDITION AND STATUS. ALL QUESTIONS 
ANSWERED, RN IS AWARE AND VERBALIZES UNDERSTANDING THAT PT WILL HAVE PHYSICAL THERAPY AT 
[facility].

## 2019-07-25 NOTE — NUR
PT RESTLESS AND AGITATED, MEDICATION PER MD ORDER ADMINISTERED, PT TOLERATED WELL, NO 
DISTRESS NOTED, CALL LIGHT WITHIN REACH, WILL CONTINUE TO MONITOR.

## 2019-11-06 NOTE — NUR
REPORT GIVEN TO NIGHT SHIFT RN FOR CONTINUITY OF CARE. PT STABLE. Patient/Caregiver provided printed discharge information.

## 2022-07-13 NOTE — NUR
Addended by: CHHAYA SAUNDERS on: 7/13/2022 07:47 AM     Modules accepted: Orders     SCHEDULED MEDS ADMINISTERED, PATIENT TOLERATED WELL.

## 2023-06-13 NOTE — NUR
-------------------------------------------------------------------------------

Addendum: 07/18/19 at 0703 by Beverly Arcos RN

-------------------------------------------------------------------------------

NO COVERAGE NEEDED Burow's Advancement Flap Text: The defect edges were debeveled with a #15 scalpel blade.  Given the location of the defect and the proximity to free margins a Burow's advancement flap was deemed most appropriate.  Using a sterile surgical marker, the appropriate advancement flap was drawn incorporating the defect and placing the expected incisions within the relaxed skin tension lines where possible.    The area thus outlined was incised deep to adipose tissue with a #15 scalpel blade.  The skin margins were undermined to an appropriate distance in all directions utilizing iris scissors.
